# Patient Record
Sex: FEMALE | Race: BLACK OR AFRICAN AMERICAN | NOT HISPANIC OR LATINO | Employment: FULL TIME | URBAN - METROPOLITAN AREA
[De-identification: names, ages, dates, MRNs, and addresses within clinical notes are randomized per-mention and may not be internally consistent; named-entity substitution may affect disease eponyms.]

---

## 2023-09-10 PROBLEM — H04.123 BILATERAL DRY EYES: Status: ACTIVE | Noted: 2023-09-10

## 2023-09-10 PROBLEM — D64.9 ANEMIA: Status: ACTIVE | Noted: 2023-09-10

## 2023-09-10 PROBLEM — R20.2 PARESTHESIA: Status: ACTIVE | Noted: 2023-09-10

## 2023-09-10 PROBLEM — G89.29 CHRONIC NECK AND BACK PAIN: Status: ACTIVE | Noted: 2023-09-10

## 2023-09-10 PROBLEM — K21.9 GERD (GASTROESOPHAGEAL REFLUX DISEASE): Status: ACTIVE | Noted: 2023-09-10

## 2023-09-10 PROBLEM — R00.2 PALPITATION: Status: ACTIVE | Noted: 2023-09-10

## 2023-09-10 PROBLEM — R51.9 HEADACHE DISORDER: Status: ACTIVE | Noted: 2023-09-10

## 2023-09-10 PROBLEM — M25.561 CHRONIC PAIN OF BOTH KNEES: Status: ACTIVE | Noted: 2023-09-10

## 2023-09-10 PROBLEM — M25.562 CHRONIC PAIN OF BOTH KNEES: Status: ACTIVE | Noted: 2023-09-10

## 2023-09-10 PROBLEM — M06.09 POLYARTHRITIS WITH NEGATIVE RHEUMATOID FACTOR (MULTI): Status: ACTIVE | Noted: 2023-09-10

## 2023-09-10 PROBLEM — F43.22 ADJUSTMENT DISORDER WITH ANXIOUS MOOD: Status: ACTIVE | Noted: 2023-09-10

## 2023-09-10 PROBLEM — R41.3 MEMORY CHANGES: Status: ACTIVE | Noted: 2023-09-10

## 2023-09-10 PROBLEM — M54.9 CHRONIC NECK AND BACK PAIN: Status: ACTIVE | Noted: 2023-09-10

## 2023-09-10 PROBLEM — L98.9 SKIN LESIONS: Status: ACTIVE | Noted: 2023-09-10

## 2023-09-10 PROBLEM — R53.1 ASTHENIA: Status: ACTIVE | Noted: 2023-09-10

## 2023-09-10 PROBLEM — R73.9 HYPERGLYCEMIA: Status: ACTIVE | Noted: 2023-09-10

## 2023-09-10 PROBLEM — G89.29 CHRONIC PAIN: Status: ACTIVE | Noted: 2023-09-10

## 2023-09-10 PROBLEM — G57.91 NEURITIS OF RIGHT FOOT: Status: ACTIVE | Noted: 2023-09-10

## 2023-09-10 PROBLEM — M54.2 CHRONIC NECK AND BACK PAIN: Status: ACTIVE | Noted: 2023-09-10

## 2023-09-10 PROBLEM — H25.13 AGE-RELATED NUCLEAR CATARACT, BILATERAL: Status: ACTIVE | Noted: 2023-09-10

## 2023-09-10 PROBLEM — E55.9 VITAMIN D DEFICIENCY: Status: ACTIVE | Noted: 2023-09-10

## 2023-09-10 PROBLEM — G47.00 INSOMNIA: Status: ACTIVE | Noted: 2023-09-10

## 2023-09-10 PROBLEM — R09.82 POSTNASAL DRIP: Status: ACTIVE | Noted: 2023-09-10

## 2023-09-10 PROBLEM — F43.21 GRIEF: Status: ACTIVE | Noted: 2023-09-10

## 2023-09-10 PROBLEM — H54.7 VISUAL ACUITY REDUCED: Status: ACTIVE | Noted: 2023-09-10

## 2023-09-10 PROBLEM — G89.29 CHRONIC PAIN OF BOTH KNEES: Status: ACTIVE | Noted: 2023-09-10

## 2023-09-10 PROBLEM — R76.8 ANA POSITIVE: Status: ACTIVE | Noted: 2023-09-10

## 2023-09-10 PROBLEM — E78.5 HLD (HYPERLIPIDEMIA): Status: ACTIVE | Noted: 2023-09-10

## 2023-09-10 PROBLEM — F07.81 POST CONCUSSION SYNDROME: Status: ACTIVE | Noted: 2023-09-10

## 2023-11-22 ENCOUNTER — TELEPHONE (OUTPATIENT)
Dept: OBSTETRICS AND GYNECOLOGY | Facility: CLINIC | Age: 53
End: 2023-11-22

## 2023-11-22 DIAGNOSIS — R92.8 ABNORMAL MAMMOGRAM: Primary | ICD-10-CM

## 2023-11-30 ENCOUNTER — HOSPITAL ENCOUNTER (OUTPATIENT)
Dept: RADIOLOGY | Facility: HOSPITAL | Age: 53
Discharge: HOME | End: 2023-11-30
Payer: COMMERCIAL

## 2023-11-30 DIAGNOSIS — R92.8 ABNORMAL MAMMOGRAM: ICD-10-CM

## 2023-11-30 DIAGNOSIS — R92.8 OTHER ABNORMAL AND INCONCLUSIVE FINDINGS ON DIAGNOSTIC IMAGING OF BREAST: ICD-10-CM

## 2023-12-11 ENCOUNTER — APPOINTMENT (OUTPATIENT)
Dept: PRIMARY CARE | Facility: CLINIC | Age: 53
End: 2023-12-11

## 2023-12-12 NOTE — PROGRESS NOTES
"This is a 53 year old female for ROUTINE MEDICAL and indicates she has MUCH JOB STRESS and took FMLA the past week and the last three days also had FLARE UP of CHRONIC PAIN.      ROS is NEG for HEADACHE, NAUSEA, VOMITING, DIARRHEA, CHEST PAIN, SOB, and BLEEDING and as further REVIEWED BELOW.      Subjective   Terece JEWEL Boo is a 53 y.o. female who presents for Annual Exam.    HPI:        Review of systems is essentially negative for all systems except for any identified issues in HPI above.    Objective     /82   Pulse 83   Temp 36.4 °C (97.5 °F)   Ht 1.6 m (5' 3\")   Wt 78.5 kg (173 lb)   SpO2 100%   BMI 30.65 kg/m²      COMPLETE PHYSICAL EXAM    GENERAL           General Appearance: pleasant, well-appearing, well-developed, well-hydrated, well-nourished, well-groomed, .        HEENT           NECK supple, Neg for adneopathy no thyroid enlargement or nodules, Oropharynx normal no exudates.        EYES           Pupils: PERRLA, no photophobia.        HEART           Rate and Rhythm regular rate and rhythm. Heart sounds: normal S1S2. Murmurs: none.        LUNGS           Effort: Normal chest wall, no pectus, Normal air entry all fields, Clear to IPPA, RR<16 with no use of accessory muscles.        BREASTS           Bilaterally: no masses, no nipple retraction, no nipple discharge, no abnormal skin changes. Axilla: no lymphadenopathy.  HAS POS FAM Hx BREAST CA and was ASKED TO START ESTROGEN and PT THANKFULLY DECLINED        BACK           General: unremarkable, no spinal tenderness or rashes.        ABDOMEN           General: Normal to inspection, neg for LKKS or masses and BSs heard in all quadrants                 LYMPHATICS           Cervical: none. Axillary: none.        MUSCULOSKELETAL           gross abnormalities no gross abnormalities, no joint redness or swelling.        EXTREMITIES           Varicose veins: not present. Pulses: 2+ bilateral. Clubbing: none. Cyanosis: no.        " NEUROLOGICAL           Orientation: alert and oriented x 3. Grossly normal: yes. Plantars: downgoing bilaterally. Muscle Bulk: normal . Cranial Nerves: CN's II-XII grossly intact.        PSYCHOLOGY           Affect: appropriate. Mood: pleasant.     Assessment/Plan   Problem List Items Addressed This Visit       Adjustment disorder with anxious mood    ALEXA positive    Paresthesia    Polyarthritis with negative rheumatoid factor (CMS/HCC)     Other Visit Diagnoses       Routine medical exam    -  Primary    Relevant Orders    Comprehensive metabolic panel    Microscopic Only, Urine    Hemoglobin A1c    Screening mammogram for breast cancer        Relevant Orders    BI mammo bilateral screening tomosynthesis    Screening for colorectal cancer        Relevant Orders    CBC    Screening, lipid        Health counseling        Immunization counseling        Family history of breast cancer                FOLLOW UP:       YEARLY for ROUTINE MEDICAL and PRN for other issues as discussed in visit         Kerry Hollingsworth M.D.

## 2023-12-15 ENCOUNTER — OFFICE VISIT (OUTPATIENT)
Dept: PRIMARY CARE | Facility: CLINIC | Age: 53
End: 2023-12-15
Payer: COMMERCIAL

## 2023-12-15 ENCOUNTER — LAB (OUTPATIENT)
Dept: LAB | Facility: LAB | Age: 53
End: 2023-12-15
Payer: COMMERCIAL

## 2023-12-15 VITALS
WEIGHT: 173 LBS | BODY MASS INDEX: 30.65 KG/M2 | TEMPERATURE: 97.5 F | DIASTOLIC BLOOD PRESSURE: 82 MMHG | OXYGEN SATURATION: 100 % | SYSTOLIC BLOOD PRESSURE: 130 MMHG | HEART RATE: 83 BPM | HEIGHT: 63 IN

## 2023-12-15 DIAGNOSIS — Z12.12 SCREENING FOR COLORECTAL CANCER: ICD-10-CM

## 2023-12-15 DIAGNOSIS — Z12.11 SCREENING FOR COLORECTAL CANCER: ICD-10-CM

## 2023-12-15 DIAGNOSIS — R20.2 PARESTHESIA: ICD-10-CM

## 2023-12-15 DIAGNOSIS — R76.8 ANA POSITIVE: ICD-10-CM

## 2023-12-15 DIAGNOSIS — M06.09 POLYARTHRITIS WITH NEGATIVE RHEUMATOID FACTOR (MULTI): ICD-10-CM

## 2023-12-15 DIAGNOSIS — Z00.00 ROUTINE MEDICAL EXAM: Primary | ICD-10-CM

## 2023-12-15 DIAGNOSIS — Z12.31 SCREENING MAMMOGRAM FOR BREAST CANCER: ICD-10-CM

## 2023-12-15 DIAGNOSIS — Z80.3 FAMILY HISTORY OF BREAST CANCER: ICD-10-CM

## 2023-12-15 DIAGNOSIS — Z13.220 SCREENING, LIPID: ICD-10-CM

## 2023-12-15 DIAGNOSIS — Z71.9 HEALTH COUNSELING: ICD-10-CM

## 2023-12-15 DIAGNOSIS — Z71.85 IMMUNIZATION COUNSELING: ICD-10-CM

## 2023-12-15 DIAGNOSIS — Z00.00 ROUTINE MEDICAL EXAM: ICD-10-CM

## 2023-12-15 DIAGNOSIS — F43.22 ADJUSTMENT DISORDER WITH ANXIOUS MOOD: ICD-10-CM

## 2023-12-15 LAB
ALBUMIN SERPL BCP-MCNC: 4.3 G/DL (ref 3.4–5)
ALP SERPL-CCNC: 67 U/L (ref 33–110)
ALT SERPL W P-5'-P-CCNC: 18 U/L (ref 7–45)
ANION GAP SERPL CALC-SCNC: 13 MMOL/L (ref 10–20)
AST SERPL W P-5'-P-CCNC: 19 U/L (ref 9–39)
BACTERIA #/AREA URNS AUTO: ABNORMAL /HPF
BILIRUB SERPL-MCNC: 0.4 MG/DL (ref 0–1.2)
BUN SERPL-MCNC: 13 MG/DL (ref 6–23)
CALCIUM SERPL-MCNC: 9.7 MG/DL (ref 8.6–10.6)
CAOX CRY #/AREA UR COMP ASSIST: ABNORMAL /HPF
CHLORIDE SERPL-SCNC: 106 MMOL/L (ref 98–107)
CO2 SERPL-SCNC: 28 MMOL/L (ref 21–32)
CREAT SERPL-MCNC: 0.66 MG/DL (ref 0.5–1.05)
ERYTHROCYTE [DISTWIDTH] IN BLOOD BY AUTOMATED COUNT: 12.7 % (ref 11.5–14.5)
EST. AVERAGE GLUCOSE BLD GHB EST-MCNC: 117 MG/DL
GFR SERPL CREATININE-BSD FRML MDRD: >90 ML/MIN/1.73M*2
GLUCOSE SERPL-MCNC: 77 MG/DL (ref 74–99)
HBA1C MFR BLD: 5.7 %
HCT VFR BLD AUTO: 41.5 % (ref 36–46)
HGB BLD-MCNC: 13.7 G/DL (ref 12–16)
MCH RBC QN AUTO: 31.9 PG (ref 26–34)
MCHC RBC AUTO-ENTMCNC: 33 G/DL (ref 32–36)
MCV RBC AUTO: 97 FL (ref 80–100)
MUCOUS THREADS #/AREA URNS AUTO: ABNORMAL /LPF
NRBC BLD-RTO: 0 /100 WBCS (ref 0–0)
PLATELET # BLD AUTO: 183 X10*3/UL (ref 150–450)
POTASSIUM SERPL-SCNC: 4.3 MMOL/L (ref 3.5–5.3)
PROT SERPL-MCNC: 7.8 G/DL (ref 6.4–8.2)
RBC # BLD AUTO: 4.29 X10*6/UL (ref 4–5.2)
RBC #/AREA URNS AUTO: ABNORMAL /HPF
SODIUM SERPL-SCNC: 143 MMOL/L (ref 136–145)
SQUAMOUS #/AREA URNS AUTO: ABNORMAL /HPF
WBC # BLD AUTO: 7 X10*3/UL (ref 4.4–11.3)
WBC #/AREA URNS AUTO: ABNORMAL /HPF

## 2023-12-15 PROCEDURE — 36415 COLL VENOUS BLD VENIPUNCTURE: CPT

## 2023-12-15 PROCEDURE — 99396 PREV VISIT EST AGE 40-64: CPT | Performed by: FAMILY MEDICINE

## 2023-12-15 PROCEDURE — 90471 IMMUNIZATION ADMIN: CPT | Performed by: FAMILY MEDICINE

## 2023-12-15 PROCEDURE — 1036F TOBACCO NON-USER: CPT | Performed by: FAMILY MEDICINE

## 2023-12-15 PROCEDURE — 83036 HEMOGLOBIN GLYCOSYLATED A1C: CPT

## 2023-12-15 PROCEDURE — 80053 COMPREHEN METABOLIC PANEL: CPT

## 2023-12-15 PROCEDURE — 85027 COMPLETE CBC AUTOMATED: CPT

## 2023-12-15 PROCEDURE — 81001 URINALYSIS AUTO W/SCOPE: CPT

## 2023-12-15 PROCEDURE — 90662 IIV NO PRSV INCREASED AG IM: CPT | Performed by: FAMILY MEDICINE

## 2023-12-15 RX ORDER — NAPROXEN 500 MG/1
TABLET ORAL EVERY 12 HOURS
COMMUNITY
Start: 2019-08-21

## 2023-12-15 RX ORDER — ACETAMINOPHEN 325 MG/1
TABLET ORAL EVERY 4 HOURS
COMMUNITY

## 2023-12-15 ASSESSMENT — PATIENT HEALTH QUESTIONNAIRE - PHQ9
SUM OF ALL RESPONSES TO PHQ9 QUESTIONS 1 AND 2: 0
2. FEELING DOWN, DEPRESSED OR HOPELESS: NOT AT ALL
1. LITTLE INTEREST OR PLEASURE IN DOING THINGS: NOT AT ALL

## 2023-12-15 ASSESSMENT — PAIN SCALES - GENERAL: PAINLEVEL: 8

## 2023-12-19 ENCOUNTER — TELEPHONE (OUTPATIENT)
Dept: PRIMARY CARE | Facility: CLINIC | Age: 53
End: 2023-12-19

## 2023-12-19 ENCOUNTER — OFFICE VISIT (OUTPATIENT)
Dept: PRIMARY CARE | Facility: CLINIC | Age: 53
End: 2023-12-19
Payer: COMMERCIAL

## 2023-12-19 VITALS
HEIGHT: 63 IN | WEIGHT: 173 LBS | BODY MASS INDEX: 30.65 KG/M2 | TEMPERATURE: 97.5 F | DIASTOLIC BLOOD PRESSURE: 82 MMHG | HEART RATE: 72 BPM | SYSTOLIC BLOOD PRESSURE: 120 MMHG | OXYGEN SATURATION: 98 %

## 2023-12-19 DIAGNOSIS — R82.90 ABNORMAL URINALYSIS: Primary | ICD-10-CM

## 2023-12-19 PROCEDURE — 1036F TOBACCO NON-USER: CPT | Performed by: FAMILY MEDICINE

## 2023-12-19 PROCEDURE — 99214 OFFICE O/P EST MOD 30 MIN: CPT | Performed by: FAMILY MEDICINE

## 2023-12-19 ASSESSMENT — PATIENT HEALTH QUESTIONNAIRE - PHQ9
1. LITTLE INTEREST OR PLEASURE IN DOING THINGS: NOT AT ALL
2. FEELING DOWN, DEPRESSED OR HOPELESS: NOT AT ALL
SUM OF ALL RESPONSES TO PHQ9 QUESTIONS 1 AND 2: 0

## 2023-12-19 ASSESSMENT — PAIN SCALES - GENERAL: PAINLEVEL: 7

## 2023-12-19 NOTE — TELEPHONE ENCOUNTER
MD Josselin Lovelace MA  Yes, OK to make exception to my overbooking schedule and bring her in at her convenience for FU to expedite CT scanning etc.                ----- Message -----  From: Kerry Arana MD  Sent: 12/18/2023   9:51 AM EST  To: Alysha Waddell LPN; *    Prefer to have her seen in office and will make exception to double book her please offer her a visit at a time convenient to avoid extreme overbooking.

## 2023-12-19 NOTE — PROGRESS NOTES
"This is a 53 year old female for FU visit for EVAL of POSSIBLE RENAL STONES shown as CALCIUM OXYLATE in UA and CT CONTRAST ALLERGY LISTED in chart but is NOT a TRUE ALLERGY; just anxiety about CT scans;  so we order today.                  Component  Ref Range & Units 4 d ago  (12/15/23) 7 mo ago  (5/5/23) 9 mo ago  (3/13/23) 9 mo ago  (3/13/23) 1 yr ago  (8/26/22) 1 yr ago  (6/2/22) 2 yr ago  (11/5/21)   Glucose  74 - 99 mg/dL 77 78 R  92 R 99 R 116 High  R 92 R   Sodium  136 - 145 mmol/L 143 142 R  141 R 139 R 139 R 136 R   Potassium  3.5 - 5.3 mmol/L 4.3 3.9 R  3.6 R 3.9 R 3.7 R 4.1 R   Comment: MILD HEMOLYSIS DETECTED. The result may be falsely elevated due to hemolysis or other interferents. Clinical correlation is recommended. Repeat testing may be considered.   Chloride  98 - 107 mmol/L 106 105 R  106 R 101 R 102 R 102 R   Bicarbonate  21 - 32 mmol/L 28 26 R  25 R 27 R 26 R 27 R   Anion Gap  10 - 20 mmol/L 13 11 R  10 R 11 R 11 R 7 R   Urea Nitrogen  6 - 23 mg/dL 13 11 R  8 R 9 R 13 R 12 R   Creatinine  0.50 - 1.05 mg/dL 0.66 0.8 R  0.8 R 0.7 R 0.8 R 0.8 R   eGFR  >60 mL/min/1.73m*2 >90            Subjective   Randy Boo is a 53 y.o. female who presents for Flank Pain.    HPI:        Review of systems is essentially negative for all systems except for any identified issues in HPI above.    Objective     /82   Pulse 72   Temp 36.4 °C (97.5 °F)   Ht 1.6 m (5' 3\")   Wt 78.5 kg (173 lb)   SpO2 98%   BMI 30.65 kg/m²      Physical Examination:       GENERAL           General Appearance: well-appearing, well-developed, well-hydrated, well-nourished, no acute distress.        HEENT           NECK supple, no masses or thyromegaly, no carotid bruit.        EYES           Extraocular Movements: normal, bilateral eyes HUONG, no conjunctival injection.        HEART           Rate and Rhythm regular rate and rhythm. Heart sounds: normal S1S2, no S3 or S4. Murmurs: none.        CHEST           " Breath sounds: Clear to IPPA, RR<16 no use of accessory muscles. NEG FOR CVA TENDERNESS but POS FOR LBP and ++ CALCIUM CRYSTALS       ABDOMEN            General: Neg for LKKS or masses, no scleral icterus or jaundice.   NEG FOR SUPRAPUBIC tenderness       MUSCULOSKELETAL           Joints Demonstration: Neg for erythema, swelling or joint deformities. gross abnormalities no gross abnormalities.        EXTREMITIES           Lower Extremities: Neg for cyanosis, clubbing or edema.       Assessment/Plan   Problem List Items Addressed This Visit    None  Visit Diagnoses       Abnormal urinalysis    -  Primary    Relevant Orders    Urine Culture    Referral to Urology    CT abdomen pelvis w IV contrast            FOLLOW UP:  PRN and as specified above         Kerry Hollingsworth M.D.       Minimal and ONLY LBP but no FLANK pain or PELVIC PAIN but will try to rule out stones at pt request.      3 Result Notes         Component  Ref Range & Units 4 d ago  (12/15/23) 1 yr ago  (8/26/22) 1 yr ago  (6/2/22) 5 yr ago  (5/6/18)   WBC, Urine  1-5, NONE /HPF 11-20 Abnormal  1 R 2 R 5 Abnormal  R   RBC, Urine  NONE, 1-2, 3-5 /HPF 1-2 1 R 2 R 2 Abnormal  R   Squamous Epithelial Cells, Urine  Reference range not established. /HPF 1-9 (SPARSE) NONE SEEN R FEW R 2 R   Bacteria, Urine  NONE SEEN /HPF 1+ Abnormal  NEGATIVE R POSITIVE R 1+ Abnormal  R   Mucus, Urine  Reference range not established. /LPF 1+   2+ R   Calcium Oxalate Crystals, Urine  NONE, 1+ /HPF 3+ Abnormal                ROS is NEG for HEADACHE, NAUSEA, VOMITING, DIARRHEA, CHEST PAIN, SOB, and BLEEDING and as further REVIEWED BELOW.

## 2023-12-19 NOTE — LETTER
December 19, 2023     Patient: Randy Boo   YOB: 1970   Date of Visit: 12/19/2023       To Whom It May Concern:    Randy Boo was seen in my clinic on 12/19/2023 at 10:00 am. Please excuse Randy for her absence from work on this day to make the appointment.    If you have any questions or concerns, please don't hesitate to call.         Sincerely,         Kerry Arana MD

## 2023-12-20 ENCOUNTER — APPOINTMENT (OUTPATIENT)
Dept: PRIMARY CARE | Facility: CLINIC | Age: 53
End: 2023-12-20
Payer: MEDICARE

## 2023-12-26 ENCOUNTER — HOSPITAL ENCOUNTER (OUTPATIENT)
Dept: RADIOLOGY | Facility: HOSPITAL | Age: 53
Discharge: HOME | End: 2023-12-26
Payer: COMMERCIAL

## 2023-12-26 ENCOUNTER — TELEPHONE (OUTPATIENT)
Dept: PRIMARY CARE | Facility: CLINIC | Age: 53
End: 2023-12-26
Payer: COMMERCIAL

## 2023-12-26 DIAGNOSIS — R82.90 ABNORMAL URINALYSIS: ICD-10-CM

## 2023-12-26 DIAGNOSIS — R11.0 NAUSEA: Primary | ICD-10-CM

## 2023-12-26 PROCEDURE — 74176 CT ABD & PELVIS W/O CONTRAST: CPT

## 2023-12-26 RX ORDER — ONDANSETRON 4 MG/1
4 TABLET, FILM COATED ORAL EVERY 8 HOURS PRN
Qty: 10 TABLET | Refills: 0 | Status: SHIPPED | OUTPATIENT
Start: 2023-12-26

## 2023-12-26 NOTE — TELEPHONE ENCOUNTER
PT calling requesting RX for nausea be sent in, as PT has a CT and MRI scheduled in the next few days with dye injection.  PT wondering if OK to do tests 2 days in a row with the dye injection. Should it all be done in one day?  Please advise

## 2023-12-27 ENCOUNTER — HOSPITAL ENCOUNTER (OUTPATIENT)
Dept: RADIOLOGY | Facility: HOSPITAL | Age: 53
Discharge: HOME | End: 2023-12-27
Payer: COMMERCIAL

## 2023-12-27 DIAGNOSIS — Z12.31 SCREENING MAMMOGRAM FOR BREAST CANCER: ICD-10-CM

## 2023-12-27 DIAGNOSIS — Z80.3 FAMILY HISTORY OF BREAST CANCER: ICD-10-CM

## 2023-12-27 PROCEDURE — 77049 MRI BREAST C-+ W/CAD BI: CPT | Performed by: STUDENT IN AN ORGANIZED HEALTH CARE EDUCATION/TRAINING PROGRAM

## 2023-12-27 PROCEDURE — 2550000001 HC RX 255 CONTRASTS: Mod: SE | Performed by: FAMILY MEDICINE

## 2023-12-27 PROCEDURE — 77049 MRI BREAST C-+ W/CAD BI: CPT

## 2023-12-27 PROCEDURE — A9575 INJ GADOTERATE MEGLUMI 0.1ML: HCPCS | Mod: SE | Performed by: FAMILY MEDICINE

## 2023-12-27 RX ORDER — GADOTERATE MEGLUMINE 376.9 MG/ML
12 INJECTION INTRAVENOUS
Status: COMPLETED | OUTPATIENT
Start: 2023-12-27 | End: 2023-12-27

## 2023-12-27 RX ADMIN — GADOTERATE MEGLUMINE 12 ML: 376.9 INJECTION INTRAVENOUS at 20:58

## 2023-12-29 ENCOUNTER — TELEPHONE (OUTPATIENT)
Dept: PRIMARY CARE | Facility: CLINIC | Age: 53
End: 2023-12-29
Payer: COMMERCIAL

## 2023-12-29 NOTE — TELEPHONE ENCOUNTER
PT states FMLA paperwork about a month ago, but HR at her job has just recently told her that the paperwork was filled out incorrectly.  PT states she has work restrictions due to medical conditions that she is still dealing with, and her job is requiring her to walk further distances than she is able to.   States paperwork does not identify what the true disability is.  HR is requesting a diagnosis that requires her to be unable to walk causing a disability. Please advise if this can be resent with correct information. Originally dated 12/15/2023.

## 2024-01-03 ENCOUNTER — APPOINTMENT (OUTPATIENT)
Dept: PRIMARY CARE | Facility: CLINIC | Age: 54
End: 2024-01-03
Payer: COMMERCIAL

## 2024-01-03 NOTE — PROGRESS NOTES
This is a 53 year old female for FU BREAST MRI RESULTS SHOWING INTRAMAMMARY LYMPH NODE and to discuss WORK RESTRICTIONS and REVIEW of MRI BREASTS showing INTRAMAMMARY LYMPH NODE so will refer to GENERAL BREAST SURGEON for opinion; and RECENT CT ABDO non obstructive renal CALCULUS.  She is attending UROLOGY for this and has already been seen with 6 month FU visit planned.  COPIOUS FLUIDS recommended.    MRI without evidence of malignancy in either breast.  Left breast does show a likely intramammary lymph node. Recommend PCP follow-up for further discussion/evaluation of breast pain.     SHE HAS URI Sxs today and sneezing and sore throat and this was converted from OFFICE VISIT to TELE VISIT.  ENCOURAGED TO HOME TEST for COVID (states she took son to  and no testing was done except for sore throat she says).  SHE WILL INITIATE a HOME QUARANTINE and has NO SOB or CP or other worrisome features.   Encoruaged to HOME TEST for COVID as well.  SON who is ill apparently neg for STREP.      IMPRESSION:  No MRI evidence of malignancy in either breast.  Clinical follow-up  is recommended for the patient's breast pain.      BI-RADS CATEGORY:  BI-RADS Category:  2 Benign.  Recommendation:  Routine Screening Mammogram in 1 Year.  Recommended Date:  1 Year.  Laterality:  Bilateral.      ROS is NEG for HEADACHE, NAUSEA, VOMITING, DIARRHEA, CHEST PAIN, SOB, and BLEEDING and as further REVIEWED BELOW.    Subjective   Randy Boo is a 53 y.o. female who presents for Letter for School/Work.    HPI:        Review of systems is essentially negative for all systems except for any identified issues in HPI above.    Objective     There were no vitals taken for this visit.     Assessment/Plan     ALSO NEEDS PPWK for REASONABLE ACCOMMODATION to be revised and FMLA PPWK  and DISABILITY PPWK REVISED so needs a FU visit once out of HOME QUARANTINE.  To meanwhile obtain and review and complete as much as she can on her own and  bring PPWK to our office for a VISIT with Dr. Hollingsworth      AT THIS TIME she declines PCR COVID TESTING and STREP TESTING but will report to  if sxs worsen and will do a HOME TEST and HOME QUARANTINE as per protocol 5 days from onset of Sxs.    Problem List Items Addressed This Visit       Adjustment disorder with anxious mood    ALEXA positive    Paresthesia - Primary    Polyarthritis with negative rheumatoid factor (CMS/HCC)     Other Visit Diagnoses       Health counseling        Immunization counseling        Mastalgia        Relevant Orders    Referral to Breast Surgery    Abnormal MRI, breast        Upper respiratory tract infection, unspecified type                FOLLOW UP:  PRN and as specified above         Kerry Hollingsworth M.D.

## 2024-01-04 ENCOUNTER — OFFICE VISIT (OUTPATIENT)
Dept: UROLOGY | Facility: CLINIC | Age: 54
End: 2024-01-04
Payer: COMMERCIAL

## 2024-01-04 ENCOUNTER — APPOINTMENT (OUTPATIENT)
Dept: UROLOGY | Facility: CLINIC | Age: 54
End: 2024-01-04
Payer: COMMERCIAL

## 2024-01-04 VITALS
BODY MASS INDEX: 30.43 KG/M2 | WEIGHT: 171.8 LBS | SYSTOLIC BLOOD PRESSURE: 105 MMHG | TEMPERATURE: 97.1 F | HEART RATE: 84 BPM | RESPIRATION RATE: 18 BRPM | DIASTOLIC BLOOD PRESSURE: 70 MMHG

## 2024-01-04 DIAGNOSIS — R82.90 ABNORMAL URINALYSIS: ICD-10-CM

## 2024-01-04 DIAGNOSIS — N20.0 KIDNEY STONES: Primary | ICD-10-CM

## 2024-01-04 LAB
POC APPEARANCE, URINE: CLEAR
POC BILIRUBIN, URINE: NEGATIVE
POC BLOOD, URINE: ABNORMAL
POC COLOR, URINE: ABNORMAL
POC GLUCOSE, URINE: NEGATIVE MG/DL
POC KETONES, URINE: ABNORMAL MG/DL
POC LEUKOCYTES, URINE: ABNORMAL
POC NITRITE,URINE: NEGATIVE
POC PH, URINE: 6 PH
POC PROTEIN, URINE: ABNORMAL MG/DL
POC SPECIFIC GRAVITY, URINE: 1.02
POC UROBILINOGEN, URINE: 1 EU/DL

## 2024-01-04 PROCEDURE — 99214 OFFICE O/P EST MOD 30 MIN: CPT | Mod: ZK | Performed by: PHYSICIAN ASSISTANT

## 2024-01-04 PROCEDURE — 99204 OFFICE O/P NEW MOD 45 MIN: CPT | Performed by: PHYSICIAN ASSISTANT

## 2024-01-04 PROCEDURE — 81001 URINALYSIS AUTO W/SCOPE: CPT | Performed by: PHYSICIAN ASSISTANT

## 2024-01-04 PROCEDURE — 81003 URINALYSIS AUTO W/O SCOPE: CPT | Performed by: NURSE PRACTITIONER

## 2024-01-04 PROCEDURE — 87086 URINE CULTURE/COLONY COUNT: CPT | Performed by: PHYSICIAN ASSISTANT

## 2024-01-04 PROCEDURE — 81003 URINALYSIS AUTO W/O SCOPE: CPT | Mod: ZK | Performed by: NURSE PRACTITIONER

## 2024-01-04 PROCEDURE — 1036F TOBACCO NON-USER: CPT | Performed by: PHYSICIAN ASSISTANT

## 2024-01-04 ASSESSMENT — ENCOUNTER SYMPTOMS
DEPRESSION: 0
OCCASIONAL FEELINGS OF UNSTEADINESS: 0
LOSS OF SENSATION IN FEET: 0

## 2024-01-04 ASSESSMENT — PAIN SCALES - GENERAL: PAINLEVEL: 0-NO PAIN

## 2024-01-05 PROBLEM — N20.0 KIDNEY STONES: Status: ACTIVE | Noted: 2024-01-05

## 2024-01-05 LAB
APPEARANCE UR: ABNORMAL
BACTERIA UR CULT: NORMAL
BILIRUB UR STRIP.AUTO-MCNC: NEGATIVE MG/DL
COLOR UR: ABNORMAL
GLUCOSE UR STRIP.AUTO-MCNC: NEGATIVE MG/DL
KETONES UR STRIP.AUTO-MCNC: NEGATIVE MG/DL
LEUKOCYTE ESTERASE UR QL STRIP.AUTO: ABNORMAL
NITRITE UR QL STRIP.AUTO: NEGATIVE
PH UR STRIP.AUTO: 6 [PH]
PROT UR STRIP.AUTO-MCNC: NEGATIVE MG/DL
RBC # UR STRIP.AUTO: NEGATIVE /UL
RBC #/AREA URNS AUTO: NORMAL /HPF
SP GR UR STRIP.AUTO: 1.02
SQUAMOUS #/AREA URNS AUTO: NORMAL /HPF
UROBILINOGEN UR STRIP.AUTO-MCNC: 2 MG/DL
WBC #/AREA URNS AUTO: NORMAL /HPF

## 2024-01-05 ASSESSMENT — ENCOUNTER SYMPTOMS
ALLERGIC/IMMUNOLOGIC NEGATIVE: 1
GASTROINTESTINAL NEGATIVE: 1
ENDOCRINE NEGATIVE: 1
CONSTITUTIONAL NEGATIVE: 1
RESPIRATORY NEGATIVE: 1
MUSCULOSKELETAL NEGATIVE: 1
HEMATOLOGIC/LYMPHATIC NEGATIVE: 1
EYES NEGATIVE: 1
PSYCHIATRIC NEGATIVE: 1
CARDIOVASCULAR NEGATIVE: 1
NEUROLOGICAL NEGATIVE: 1

## 2024-01-05 NOTE — PROGRESS NOTES
Subjective   Patient ID: Randy Boo is a 53 y.o. female who presents for New Patient Visit.  HPI  Patient is a 52 yo female kindly referred by Dr Barry for management of renal stone.     Patient had a CT scan for evaluation of back pain and had an incidental finding of a 3 mm right renal stone. Patient denies history of kidney stones. She denies flank pain nausea or vomiting. She denies gross hematuria, dysuria, urinary frequency or urgency.     Urinalysis positive for trace of leuks and frequent blood    Review of Systems   Constitutional: Negative.    HENT: Negative.     Eyes: Negative.    Respiratory: Negative.     Cardiovascular: Negative.    Gastrointestinal: Negative.    Endocrine: Negative.    Genitourinary: Negative.    Musculoskeletal: Negative.    Skin: Negative.    Allergic/Immunologic: Negative.    Neurological: Negative.    Hematological: Negative.    Psychiatric/Behavioral: Negative.         Objective   Physical Exam  Constitutional:       General: She is not in acute distress.     Appearance: Normal appearance.   HENT:      Head: Normocephalic and atraumatic.      Nose: Nose normal.      Mouth/Throat:      Mouth: Mucous membranes are dry.   Cardiovascular:      Rate and Rhythm: Normal rate.   Pulmonary:      Effort: Pulmonary effort is normal.   Abdominal:      General: Abdomen is flat.      Palpations: Abdomen is soft.   Musculoskeletal:         General: Normal range of motion.      Cervical back: Normal range of motion and neck supple.   Skin:     General: Skin is warm and dry.   Neurological:      General: No focal deficit present.      Mental Status: She is alert and oriented to person, place, and time.   Psychiatric:         Mood and Affect: Mood normal.         Assessment/Plan   Problem List Items Addressed This Visit             ICD-10-CM    Kidney stones - Primary N20.0     I discussed kidny stone prevention with increasing fluids especially water to 2-2.5 L a day  Reduce  salt to 2g or less daily  Reduce oxalates.   Increase citrates by squeezing ramin in water.    Reading material provided    Monitpr stone size with KRISTAL in 6 months    I discussed signs and symptoms of ureteral stone including  sharp intermittent flank pain radiating to the lower back and abdomen, nausea vomiting, hematuria, increased urinary frequency or urgency possible fever or chills.    Patient aware to follow-up sooner.         Relevant Orders    US renal complete    Urine culture    Urinalysis with Reflex Microscopic (Completed)    POCT UA (nonautomated) manually resulted (Completed)    Microscopic Only, Urine (Completed)     Other Visit Diagnoses         Codes    Abnormal urinalysis     R82.90    Relevant Orders    Urine culture    Urinalysis with Reflex Microscopic (Completed)    Microscopic Only, Urine (Completed)                 Elia Hale PA-C 01/05/24 12:50 PM

## 2024-01-05 NOTE — ASSESSMENT & PLAN NOTE
I discussed kidny stone prevention with increasing fluids especially water to 2-2.5 L a day  Reduce salt to 2g or less daily  Reduce oxalates.   Increase citrates by squeezing ramin in water.    Reading material provided    Monitpr stone size with KRISTAL in 6 months    I discussed signs and symptoms of ureteral stone including  sharp intermittent flank pain radiating to the lower back and abdomen, nausea vomiting, hematuria, increased urinary frequency or urgency possible fever or chills.    Patient aware to follow-up sooner.

## 2024-01-09 ENCOUNTER — TELEMEDICINE (OUTPATIENT)
Dept: PRIMARY CARE | Facility: CLINIC | Age: 54
End: 2024-01-09
Payer: COMMERCIAL

## 2024-01-09 DIAGNOSIS — R92.8 ABNORMAL MRI, BREAST: ICD-10-CM

## 2024-01-09 DIAGNOSIS — F43.22 ADJUSTMENT DISORDER WITH ANXIOUS MOOD: ICD-10-CM

## 2024-01-09 DIAGNOSIS — Z71.85 IMMUNIZATION COUNSELING: ICD-10-CM

## 2024-01-09 DIAGNOSIS — N64.4 MASTALGIA: ICD-10-CM

## 2024-01-09 DIAGNOSIS — M06.09 POLYARTHRITIS WITH NEGATIVE RHEUMATOID FACTOR (MULTI): ICD-10-CM

## 2024-01-09 DIAGNOSIS — J06.9 UPPER RESPIRATORY TRACT INFECTION, UNSPECIFIED TYPE: ICD-10-CM

## 2024-01-09 DIAGNOSIS — R20.2 PARESTHESIA: Primary | ICD-10-CM

## 2024-01-09 DIAGNOSIS — Z71.9 HEALTH COUNSELING: ICD-10-CM

## 2024-01-09 DIAGNOSIS — R76.8 ANA POSITIVE: ICD-10-CM

## 2024-01-09 PROCEDURE — 99442 PR PHYS/QHP TELEPHONE EVALUATION 11-20 MIN: CPT | Performed by: FAMILY MEDICINE

## 2024-01-09 ASSESSMENT — PATIENT HEALTH QUESTIONNAIRE - PHQ9
1. LITTLE INTEREST OR PLEASURE IN DOING THINGS: NOT AT ALL
SUM OF ALL RESPONSES TO PHQ9 QUESTIONS 1 AND 2: 0
2. FEELING DOWN, DEPRESSED OR HOPELESS: NOT AT ALL

## 2024-02-08 ENCOUNTER — APPOINTMENT (OUTPATIENT)
Dept: SURGICAL ONCOLOGY | Facility: CLINIC | Age: 54
End: 2024-02-08
Payer: COMMERCIAL

## 2024-02-12 ENCOUNTER — TELEPHONE (OUTPATIENT)
Dept: PRIMARY CARE | Facility: CLINIC | Age: 54
End: 2024-02-12

## 2024-02-12 ENCOUNTER — CLINICAL SUPPORT (OUTPATIENT)
Dept: PRIMARY CARE | Facility: CLINIC | Age: 54
End: 2024-02-12
Payer: COMMERCIAL

## 2024-02-12 DIAGNOSIS — R05.9 COUGH, UNSPECIFIED TYPE: ICD-10-CM

## 2024-02-12 PROCEDURE — 87502 INFLUENZA DNA AMP PROBE: CPT | Mod: WESLAB

## 2024-02-12 NOTE — TELEPHONE ENCOUNTER
Patient called in thinking she may have the flu - asking if she can take mucinex and advil at the same time? Please advise. She tested covid negative on a home test yesterday. Asking if she can be added to the nurse schedule to be swabbed for flu?

## 2024-02-13 ENCOUNTER — TELEMEDICINE (OUTPATIENT)
Dept: PRIMARY CARE | Facility: CLINIC | Age: 54
End: 2024-02-13
Payer: COMMERCIAL

## 2024-02-13 DIAGNOSIS — Z71.9 HEALTH COUNSELING: ICD-10-CM

## 2024-02-13 DIAGNOSIS — J10.1 INFLUENZA B: ICD-10-CM

## 2024-02-13 DIAGNOSIS — J11.1 INFLUENZA: Primary | ICD-10-CM

## 2024-02-13 LAB
FLUAV RNA RESP QL NAA+PROBE: NOT DETECTED
FLUBV RNA RESP QL NAA+PROBE: DETECTED

## 2024-02-13 PROCEDURE — 1036F TOBACCO NON-USER: CPT | Performed by: FAMILY MEDICINE

## 2024-02-13 PROCEDURE — 99442 PR PHYS/QHP TELEPHONE EVALUATION 11-20 MIN: CPT | Performed by: FAMILY MEDICINE

## 2024-02-13 ASSESSMENT — ENCOUNTER SYMPTOMS
DEPRESSION: 0
LOSS OF SENSATION IN FEET: 0
OCCASIONAL FEELINGS OF UNSTEADINESS: 0

## 2024-02-13 ASSESSMENT — PATIENT HEALTH QUESTIONNAIRE - PHQ9
2. FEELING DOWN, DEPRESSED OR HOPELESS: NOT AT ALL
1. LITTLE INTEREST OR PLEASURE IN DOING THINGS: NOT AT ALL
SUM OF ALL RESPONSES TO PHQ9 QUESTIONS 1 AND 2: 0

## 2024-02-13 NOTE — PATIENT INSTRUCTIONS
SHE WILL NEED a NOTE FOR OFF WORK ALL WEEK THIS WEEK and until MONDAY next week to rest and recover from INFLUENZA B VIRAL ILLNESS and to report to ER of choice if CHEST PAIN or SHORTNESS OF BREATH occur.    We are unfortuantely well outside the range for BENEFIT from TAMIFLU.

## 2024-02-13 NOTE — PROGRESS NOTES
This is a 53 year old female for EVAL of FLU ILLNESS and she states SHE TESTED POSITIVE here in OFFICE for FLU and was WHEEZING    POS FOR INFLUENZA B  YESTERDAY    Denies SOB and DENIES CP to warrant ER at this time.  SHE BECAME ill TOO LONG AGO to start TAMIFLU as it was 5 days ago when her sxs started.    CURRENT Sxs are COUGH and MUCOUS and HEADACHE and body aches  Denies PLEURITIC PAIN      ROS is NEG for NAUSEA, VOMITING, DIARRHEA, CHEST PAIN, SOB, and BLEEDING and as further REVIEWED BELOW.    DUE TO LONG PAST the INTERVAL for TAMIFLU she is encouraged to HYDRATE with PEDIALYTE and take TYLENOL or ADVIL for aches and pains  MUCINEX OTC also prn    REPORT TO ER IF SOB or CP occurs.    SHE WILL NEED a NOTE FOR OFF WORK ALL WEEK THIS WEEK and until MONDAY next week to rest and recover.

## 2024-03-14 NOTE — PROGRESS NOTES
Randy Boo female   1970 53 y.o.   [id number]      Chief Complaint  Bilateral breast pain    History Of Present Illness  Randy Boo is a very pleasant 53 y.o. AA female [facility] by Kerry Arana for bilateral breast pain. She describes the pain as a daily tenderness. She denies trauma to the breast. She denies nipple discharge, fever or chills. She drinks caffeine in moderation and reports fatty foods. She does not smoke. She has a strong family history of breast cancer, see below. She has a history of left breast excisional biopsy, benign 20+ years ago. Per patient report, she underwent genetic testing with [facility] about 1-2 years ago, negative.     BREAST IMAGIN2023 Bilateral Full MRI, indicates BI-RADS Category 2. 3/20/2023 Bilateral screening mammogram, indicates BI-RADS Category 2.    REPRODUCTIVE HISTORY: menarche age 14, , first birth age 44,  x 10 months, OCP's x 2 years, menopause age unknown (hysterectomy age 46 due to benign fibroids), ovaries intact, scattered fibroglandular tissue    FAMILY CANCER HISTORY:   Mother: Breast cancer, age 78  Maternal Aunt: Breast cancer, age 70  Maternal Cousin (1): Breast cancer, age 35  Maternal Cousin (2): Breast cancer, age 69, BRCA negative     Surgical History  She has a past surgical history that includes  section, classic (2017); Other surgical history (01/15/2019); and Incisional breast biopsy (2018).     Social History  She reports that she has never smoked. She has never used smokeless tobacco. She reports that she does not drink alcohol and does not use drugs.    Family History  Family History   Problem Relation Name Age of Onset    Diabetes Mother      Heart disease Mother      Hypertension Mother      Breast cancer Mother      Mental illness Father      Dementia Father      Alzheimer's disease Father          Allergies  Iodinated contrast media;  Erythromycin base; and Latex, natural rubber    Medications  Current Outpatient Medications   Medication Instructions    acetaminophen (TylenoL) 325 mg tablet Every 4 hours    fish oil/borage/flax/om3,6,9 1 (OMEGA 3-6-9 ORAL) oral    multivitamin tablet 1 tablet, oral, Daily    naproxen (EC Naprosyn) 500 mg EC tablet oral, Every 12 hours    ondansetron (ZOFRAN) 4 mg, oral, Every 8 hours PRN         REVIEW OF SYSTEMS    Constitutional:  Negative for appetite change, fatigue, fever and unexpected weight change.   HENT:  Negative for ear pain, hearing loss, nosebleeds, sore throat and trouble swallowing.    Eyes:  Negative for discharge, itching and visual disturbance.   Respiratory:  Negative for cough, chest tightness and shortness of breath.    Cardiovascular:  Negative for chest pain, palpitations and leg swelling.   Breast: as indicated in HPI  Gastrointestinal:  Negative for abdominal pain, constipation, diarrhea and nausea.   Endocrine: Negative for cold intolerance and heat intolerance.   Genitourinary:  Negative for dysuria, frequency, hematuria, pelvic pain and vaginal bleeding.   Musculoskeletal:  Negative for arthralgias, back pain, gait problem, joint swelling and myalgias.   Skin:  Negative for color change and rash.   Allergic/Immunologic: Negative for environmental allergies and food allergies.   Neurological:  Negative for dizziness, tremors, speech difficulty, weakness, numbness and headaches.   Hematological:  Does not bruise/bleed easily.   Psychiatric/Behavioral:  Negative for agitation, dysphoric mood and sleep disturbance. The patient is not nervous/anxious.         Past Medical History  She has a past medical history of Concussion with loss of consciousness status unknown, initial encounter (05/31/2018), Contusion of left knee, initial encounter (01/16/2018), Contusion of right foot, initial encounter (06/01/2018), Iron deficiency, Kidney stone, Laceration without foreign body, right foot,  initial encounter (06/01/2018), Personal history of gestational diabetes (08/01/2019), Personal history of other benign neoplasm (11/27/2018), Personal history of other diseases of the female genital tract (11/27/2018), Personal history of urinary (tract) infections (05/15/2018), Supervision of elderly primigravida, unspecified trimester (09/30/2014), and Vulvar cyst (09/27/2018).     Physical Exam  Patient is alert and oriented x3 and in a relaxed and appropriate mood. Her gait is steady and hand grasps are equal. Sclera is clear. The breasts are nearly symmetrical. The tissue is soft without palpable abnormalities, discrete nodules or masses. The left breast has a well-healed vague excisional biopsy incision, inferior central quadrant. Both breasts are tender to touch. The skin and nipples appear normal. There is no cervical, supraclavicular or axillary lymphadenopathy. Heart rate and rhythm normal, S1 and S2 appreciated. The lungs are clear to auscultation bilaterally. Abdomen is soft and non-tender.       Physical Exam  Chest:              Last Recorded Vitals  Vitals:    03/25/24 1512   BP: 121/78   Pulse: 71       Relevant Results   Time was spent discussing digital images of the radiology testing with the patient. I explained the results in depth, along with suggested explanation for follow up recommendations based on the testing results. BI-RADS Category 2    Imaging    MR breast bilateral w contrast full protocol 12/27/2023    Narrative  Interpreted By:  Alie Islas,  STUDY:  BI MR BREAST BILATERAL WITH CONTRAST FULL PROTOCOL;  12/27/2023 9:16  pm    ACCESSION NUMBER(S):  [id number]    ORDERING CLINICIAN:  GAEL MENDEZ    INDICATION:  Bilateral breast tenderness. Family history of breast cancer.    COMPARISON:  Mammogram 03/20/2023    TECHNIQUE:  Using a dedicated breast coil, STIR axial and T1-weighted fat  saturation axial images of the breasts were obtained, the latter both  before and after  intravenous administration of Gadolinium DTPA. On an  independent workstation, 3-D images were formulated using [employer]  including time enhancement curves, subtraction images and MIP images.    Intravenous contrast:  12 mL of Dotarem    FINDINGS:  There is  symmetric  minimal bilateral background enhancement.    Density:  There are areas of scattered fibroglandular tissue.    RIGHT BREAST:  No suspicious mass or nonmass enhancement is  identified.    No axillary or internal mammary lymphadenopathy is appreciated.    LEFT BREAST: An enhancing 0.6 cm mass in the central medial breast at  mid depth (subtraction image of 124/244) corresponds to a  mammographically stable mass. It is bright on T2 weighted imaging. It  may represent an intramammary lymph node. No suspicious mass or  nonmass enhancement is identified.    No axillary or internal mammary lymphadenopathy is appreciated.    NON-BREAST FINDINGS:  None.    Impression  No MRI evidence of malignancy in either breast.  Clinical follow-up  is recommended for the patient's breast pain.    BI-RADS CATEGORY:  BI-RADS Category:  2 Benign.  Recommendation:  Routine Screening Mammogram in 1 Year.  Recommended Date:  1 Year.  Laterality:  Bilateral.    For any future breast imaging appointments, please call [phone]  ([phone]).      MACRO:  None      Signed by: Alie Islas 12/28/2023 9:23 AM  Dictation workstation:   [id number]       Assessment/Plan   Normal clinical exam, bilateral breast pain, family history of breast cancer, scattered fibroglandular tissue, BRCA negative    Plan: Breast pain education provided. Schedule bilateral annual mammogram, call if results are abnormal. Return in Fall 2024 for high risk evaluation.     Patient Discussion/Summary  Your clinical examination is normal. Please schedule your bilateral annual mammogram. I will call you if the results are abnormal. Please return in Fall 2024 for clinical exam and office visit or sooner if you  have any problems or concerns. We will discuss high risk evaluation.     You can see your health information, review clinical summaries from office visits & test results online when you follow your health with MY  Chart, a personal health record. To sign up go to [email or web address]. If you need assistance with signing up or trouble getting into your account call [facility] Patient Line 24/7 at [phone].    My office phone number is [phone] if you need to get in touch with me or have additional questions or concerns. Thank you for choosing [facility] and trusting me as your healthcare provider. I look forward to seeing you again at your next office visit. I am honored to be a provider on your health care team and I remain dedicated to helping you achieve your health goals.      Jennifer Pro, APRN-CNP

## 2024-03-21 ENCOUNTER — APPOINTMENT (OUTPATIENT)
Dept: SURGICAL ONCOLOGY | Facility: CLINIC | Age: 54
End: 2024-03-21
Payer: COMMERCIAL

## 2024-03-25 ENCOUNTER — OFFICE VISIT (OUTPATIENT)
Dept: SURGICAL ONCOLOGY | Facility: CLINIC | Age: 54
End: 2024-03-25
Payer: COMMERCIAL

## 2024-03-25 VITALS
HEART RATE: 71 BPM | SYSTOLIC BLOOD PRESSURE: 121 MMHG | WEIGHT: 176 LBS | DIASTOLIC BLOOD PRESSURE: 78 MMHG | BODY MASS INDEX: 30.05 KG/M2 | HEIGHT: 64 IN

## 2024-03-25 DIAGNOSIS — N64.4 BREAST PAIN, RIGHT: ICD-10-CM

## 2024-03-25 DIAGNOSIS — N64.4 BREAST PAIN, LEFT: ICD-10-CM

## 2024-03-25 DIAGNOSIS — Z12.31 SCREENING MAMMOGRAM FOR BREAST CANCER: Primary | ICD-10-CM

## 2024-03-25 PROCEDURE — 1036F TOBACCO NON-USER: CPT | Performed by: NURSE PRACTITIONER

## 2024-03-25 PROCEDURE — 99203 OFFICE O/P NEW LOW 30 MIN: CPT | Performed by: NURSE PRACTITIONER

## 2024-03-25 PROCEDURE — 99213 OFFICE O/P EST LOW 20 MIN: CPT | Performed by: NURSE PRACTITIONER

## 2024-03-25 RX ORDER — BISMUTH SUBSALICYLATE 262 MG
1 TABLET,CHEWABLE ORAL DAILY
COMMUNITY

## 2024-03-25 ASSESSMENT — PAIN SCALES - GENERAL: PAINLEVEL: 4

## 2024-03-25 NOTE — PATIENT INSTRUCTIONS
Your clinical examination is normal. Please schedule your bilateral annual mammogram. I will call you if the results are abnormal. Please return in Fall 2024 for clinical exam and office visit or sooner if you have any problems or concerns. We will discuss high risk evaluation.     You can see your health information, review clinical summaries from office visits & test results online when you follow your health with MY  Chart, a personal health record. To sign up go to [email or web address]. If you need assistance with signing up or trouble getting into your account call [employer] Patient Line 24/7 at [phone].    My office phone number is [phone] if you need to get in touch with me or have additional questions or concerns. Thank you for choosing [facility] and trusting me as your healthcare provider. I look forward to seeing you again at your next office visit. I am honored to be a provider on your health care team and I remain dedicated to helping you achieve your health goals.

## 2024-04-16 ENCOUNTER — HOSPITAL ENCOUNTER (OUTPATIENT)
Dept: RADIOLOGY | Facility: CLINIC | Age: 54
Discharge: HOME | End: 2024-04-16
Payer: COMMERCIAL

## 2024-04-16 VITALS — WEIGHT: 175.93 LBS | BODY MASS INDEX: 31.17 KG/M2 | HEIGHT: 63 IN

## 2024-04-16 DIAGNOSIS — Z12.31 SCREENING MAMMOGRAM FOR BREAST CANCER: ICD-10-CM

## 2024-04-16 PROCEDURE — 77063 BREAST TOMOSYNTHESIS BI: CPT | Performed by: STUDENT IN AN ORGANIZED HEALTH CARE EDUCATION/TRAINING PROGRAM

## 2024-04-16 PROCEDURE — 77067 SCR MAMMO BI INCL CAD: CPT

## 2024-04-16 PROCEDURE — 77067 SCR MAMMO BI INCL CAD: CPT | Performed by: STUDENT IN AN ORGANIZED HEALTH CARE EDUCATION/TRAINING PROGRAM

## 2024-05-09 ENCOUNTER — OFFICE VISIT (OUTPATIENT)
Dept: OPHTHALMOLOGY | Facility: CLINIC | Age: 54
End: 2024-05-09
Payer: COMMERCIAL

## 2024-05-09 ENCOUNTER — CLINICAL SUPPORT (OUTPATIENT)
Dept: OPHTHALMOLOGY | Facility: CLINIC | Age: 54
End: 2024-05-09
Payer: COMMERCIAL

## 2024-05-09 DIAGNOSIS — H25.13 AGE-RELATED NUCLEAR CATARACT, BILATERAL: Primary | ICD-10-CM

## 2024-05-09 DIAGNOSIS — H52.7 REFRACTIVE ERROR: ICD-10-CM

## 2024-05-09 PROCEDURE — 92015 DETERMINE REFRACTIVE STATE: CPT | Performed by: OPHTHALMOLOGY

## 2024-05-09 PROCEDURE — 99214 OFFICE O/P EST MOD 30 MIN: CPT | Performed by: OPHTHALMOLOGY

## 2024-05-09 RX ORDER — AMOXICILLIN 875 MG/1
TABLET, FILM COATED ORAL
COMMUNITY
Start: 2024-05-07 | End: 2024-05-28 | Stop reason: ALTCHOICE

## 2024-05-09 ASSESSMENT — KERATOMETRY
OS_AXISANGLE2_DEGREES: 180
OD_AXISANGLE2_DEGREES: 175
OS_K1POWER_DIOPTERS: 41.75
OS_K2POWER_DIOPTERS: 42.25
OS_AXISANGLE_DEGREES: 90
OD_K1POWER_DIOPTERS: 41.75
OD_K2POWER_DIOPTERS: 42.25
OD_AXISANGLE_DEGREES: 85

## 2024-05-09 ASSESSMENT — TONOMETRY
OS_IOP_MMHG: 14
IOP_METHOD: GOLDMANN APPLANATION
OD_IOP_MMHG: 14

## 2024-05-09 ASSESSMENT — VISUAL ACUITY
METHOD: SNELLEN - LINEAR
OD_CC: 20/20
OS_CC: 20/20
CORRECTION_TYPE: GLASSES

## 2024-05-09 ASSESSMENT — SLIT LAMP EXAM - LIDS
COMMENTS: 1+ BLEPHARITIS, 1+ DERMATOCHALASIS - UPPER LID
COMMENTS: 1+ BLEPHARITIS, 1+ DERMATOCHALASIS - UPPER LID

## 2024-05-09 ASSESSMENT — CUP TO DISC RATIO
OS_RATIO: 0.45
OD_RATIO: 0.45

## 2024-05-09 ASSESSMENT — PAIN SCALES - GENERAL: PAINLEVEL: 0-NO PAIN

## 2024-05-09 ASSESSMENT — ENCOUNTER SYMPTOMS
ALLERGIC/IMMUNOLOGIC NEGATIVE: 0
ENDOCRINE NEGATIVE: 0
CONSTITUTIONAL NEGATIVE: 0
MUSCULOSKELETAL NEGATIVE: 0
DEPRESSION: 0
NEUROLOGICAL NEGATIVE: 0
OCCASIONAL FEELINGS OF UNSTEADINESS: 0
RESPIRATORY NEGATIVE: 0
LOSS OF SENSATION IN FEET: 0
PSYCHIATRIC NEGATIVE: 0
EYES NEGATIVE: 0
GASTROINTESTINAL NEGATIVE: 0
CARDIOVASCULAR NEGATIVE: 0
HEMATOLOGIC/LYMPHATIC NEGATIVE: 0

## 2024-05-09 ASSESSMENT — REFRACTION_WEARINGRX
OS_AXIS: 042
OS_ADD: +2.25
OS_SPHERE: +0.00
OD_AXIS: 180
OD_ADD: +2.25
OS_CYLINDER: -0.25
OD_CYLINDER: +0.00
OD_SPHERE: +0.00

## 2024-05-09 ASSESSMENT — EXTERNAL EXAM - LEFT EYE: OS_EXAM: BROW PTOSIS

## 2024-05-09 ASSESSMENT — EXTERNAL EXAM - RIGHT EYE: OD_EXAM: BROW PTOSIS

## 2024-05-09 NOTE — PROGRESS NOTES
Subjective   Patient ID: Randy Boo is a 53 y.o. female.    Chief Complaint    Annual Exam; Blurred Vision       HPI       Blurred Vision    Context:  distance vision and near vision.             Comments    Complete exam.  No new changes in health history or meds.  Vision is good and unchanged.  Uses readers only.            Last edited by Wilfred Butterfield MD on 5/9/2024  4:31 PM.        No current outpatient medications on file. (Ophthalmology pharm classes)       Current Outpatient Medications (Other)   Medication Sig Dispense Refill    acetaminophen (TylenoL) 325 mg tablet every 4 hours.      amoxicillin (Amoxil) 875 mg tablet       fish oil/borage/flax/om3,6,9 1 (OMEGA 3-6-9 ORAL) Take by mouth.      multivitamin tablet Take 1 tablet by mouth once daily.      naproxen (EC Naprosyn) 500 mg EC tablet Take by mouth every 12 hours.      ondansetron (Zofran) 4 mg tablet Take 1 tablet (4 mg) by mouth every 8 hours if needed for nausea. 10 tablet 0       Objective   Base Eye Exam       Visual Acuity (Snellen - Linear)         Right Left Both    Dist cc 20/20 20/20     Near cc   J1+      Correction: Glasses              Tonometry (Goldmann Applanation, 4:05 PM)         Right Left    Pressure 14 14              Pupils         Dark Shape React APD    Right 4 Round 2 None    Left 4 Round 2 None              Extraocular Movement         Right Left     Full Full              Dilation       Both eyes: 1% Tropic 2.5% Phen @ 4:05 PM                  Additional Tests       Keratometry         K1 Axis K2 Axis    Right 41.75 175 42.25 85    Left 41.75 180 42.25 90                  Slit Lamp and Fundus Exam       External Exam         Right Left    External Brow ptosis Brow ptosis              Slit Lamp Exam         Right Left    Lids/Lashes 1+ Blepharitis, 1+ Dermatochalasis - upper lid 1+ Blepharitis, 1+ Dermatochalasis - upper lid    Conjunctiva/Sclera normal bulbar and palepbral conjunctiva normal bulbar and  palepbral conjunctiva    Cornea normal epi/stroma/endo and tear film normal epi/stroma/endo and tear film    Anterior Chamber ant. chamber deep and quiet ant. chamber deep and quiet    Iris iris normal iris normal    Lens 1+ nuclear sclerosis, Trace Cortical cataract 1+ nuclear sclerosis, Trace Cortical cataract    Anterior Vitreous vitreous syneresis vitreous syneresis              Fundus Exam         Right Left    Disc normal optic nerve normal optic nerve    C/D Ratio 0.45 0.45    Macula normal macula normal macula    Vessels normal retinal vessels normal retinal vessels    Periphery normal retinal periphery normal retinal periphery                  Refraction       Wearing Rx         Sphere Cylinder Axis Add    Right +0.00 +0.00 180 +2.25    Left +0.00 -0.25 042 +2.25              Final Rx         Sphere Cylinder [place] Dist VA Add Near VA    Right +0.00 -0.25 180 20/20 +2.50 20/20    Left +0.25 -0.25 040 20/20 +2.50 20/20      Expiration Date: 5/9/2026    Pupillary Distance: 69                    Assessment/Plan   Problem List Items Addressed This Visit          Eye/Vision problems    Age-related nuclear cataract, bilateral - Primary     F/u 2-3 years full.           Refractive error     Readers only.

## 2024-05-10 ENCOUNTER — APPOINTMENT (OUTPATIENT)
Dept: OPHTHALMOLOGY | Facility: CLINIC | Age: 54
End: 2024-05-10
Payer: COMMERCIAL

## 2024-05-16 ENCOUNTER — APPOINTMENT (OUTPATIENT)
Dept: PRIMARY CARE | Facility: CLINIC | Age: 54
End: 2024-05-16
Payer: COMMERCIAL

## 2024-05-25 NOTE — PROGRESS NOTES
This is a 53 year old female for EVAL of LEFT KNEE PAIN chronic INTERMITTENT since HIGH SCHOOL.    Had remote injury to KNEE but feels it may be exacerbated by her OBESITY and she states she had a HOME VISIT by  for [facility]     Lab Results   Component Value Date    HGBA1C 5.7 (H) 12/15/2023           ROS is NEG for HEADACHE, NAUSEA, VOMITING, DIARRHEA, CHEST PAIN, SOB, and BLEEDING and as further REVIEWED BELOW.    Subjective   Randy Boo is a 53 y.o. female who presents for Pain (Knees) and paperwork (Bringing in FMLA and EEO next time to fill out with MD).    HPI:    Per nursing intake, pt here for Pain (Knees) and paperwork (Bringing in FMLA and EEO next time to fill out with MD)       Review of systems is essentially negative for all systems except for any identified issues in HPI above.    Objective     /82   Pulse 82   Temp 36.2 °C (97.2 °F)   Wt 82 kg (180 lb 12.8 oz)   SpO2 100%   BMI 32.04 kg/m²      Physical Examination:       GENERAL           General Appearance: well-appearing, well-developed, well-hydrated, well-nourished, no acute distress.        HEENT           NECK supple, no masses or thyromegaly, no carotid bruit.        EYES           Extraocular Movements: normal, bilateral eyes HUONG, no conjunctival injection.        HEART           Rate and Rhythm regular rate and rhythm. Heart sounds: normal S1S2, no S3 or S4. Murmurs: none.        CHEST           Breath sounds: Clear to IPPA, RR<16 no use of accessory muscles.        ABDOMEN           General: Neg for LKKS or masses, no scleral icterus or jaundice.        MUSCULOSKELETAL           Joints Demonstration: Neg for erythema, swelling or joint deformities. gross abnormalities no gross abnormalities.        EXTREMITIES           Lower Extremities: Neg for cyanosis, clubbing or edema.       Assessment/Plan   Please set up a FU visit once the Xrs knees are completed and we can consider if you wish to entertain any  WEIGHT LOSS MEDS for OBESITY BMI 32.04  Problem List Items Addressed This Visit    None  Visit Diagnoses       Knee pain, unspecified chronicity, unspecified laterality    -  Primary    Relevant Orders    XR knee left 1-2 views    XR knee right 1-2 views    Health counseling        Immunization counseling        Screening mammogram for breast cancer        Obesity due to excess calories, unspecified classification, unspecified whether serious comorbidity present        Antibody response exam        Relevant Orders    Measles (Rubeola) Antibody, IgG    Mumps Antibody, IgG    Rubella antibody, IgG    Hepatitis B surface antibody            FOLLOW UP:  PRN and as specified above         Kerry Hollingsworth M.D.

## 2024-05-28 ENCOUNTER — OFFICE VISIT (OUTPATIENT)
Dept: PRIMARY CARE | Facility: CLINIC | Age: 54
End: 2024-05-28
Payer: COMMERCIAL

## 2024-05-28 VITALS
BODY MASS INDEX: 32.04 KG/M2 | HEART RATE: 82 BPM | DIASTOLIC BLOOD PRESSURE: 82 MMHG | TEMPERATURE: 97.2 F | OXYGEN SATURATION: 100 % | SYSTOLIC BLOOD PRESSURE: 124 MMHG | WEIGHT: 180.8 LBS

## 2024-05-28 DIAGNOSIS — E66.09 OBESITY DUE TO EXCESS CALORIES, UNSPECIFIED CLASSIFICATION, UNSPECIFIED WHETHER SERIOUS COMORBIDITY PRESENT: ICD-10-CM

## 2024-05-28 DIAGNOSIS — Z12.31 SCREENING MAMMOGRAM FOR BREAST CANCER: ICD-10-CM

## 2024-05-28 DIAGNOSIS — Z00.00 ROUTINE MEDICAL EXAM: ICD-10-CM

## 2024-05-28 DIAGNOSIS — M25.569 KNEE PAIN, UNSPECIFIED CHRONICITY, UNSPECIFIED LATERALITY: Primary | ICD-10-CM

## 2024-05-28 DIAGNOSIS — Z71.85 IMMUNIZATION COUNSELING: ICD-10-CM

## 2024-05-28 DIAGNOSIS — Z01.84 ANTIBODY RESPONSE EXAM: ICD-10-CM

## 2024-05-28 DIAGNOSIS — Z71.9 HEALTH COUNSELING: ICD-10-CM

## 2024-05-28 PROCEDURE — 90677 PCV20 VACCINE IM: CPT | Performed by: FAMILY MEDICINE

## 2024-05-28 PROCEDURE — 99214 OFFICE O/P EST MOD 30 MIN: CPT | Performed by: FAMILY MEDICINE

## 2024-05-28 PROCEDURE — 1036F TOBACCO NON-USER: CPT | Performed by: FAMILY MEDICINE

## 2024-05-28 ASSESSMENT — COLUMBIA-SUICIDE SEVERITY RATING SCALE - C-SSRS
6. HAVE YOU EVER DONE ANYTHING, STARTED TO DO ANYTHING, OR PREPARED TO DO ANYTHING TO END YOUR LIFE?: NO
2. HAVE YOU ACTUALLY HAD ANY THOUGHTS OF KILLING YOURSELF?: NO
1. IN THE PAST MONTH, HAVE YOU WISHED YOU WERE DEAD OR WISHED YOU COULD GO TO SLEEP AND NOT WAKE UP?: NO

## 2024-05-28 ASSESSMENT — PAIN SCALES - GENERAL: PAINLEVEL: 8

## 2024-07-10 ENCOUNTER — TELEPHONE (OUTPATIENT)
Dept: PRIMARY CARE | Facility: CLINIC | Age: 54
End: 2024-07-10
Payer: COMMERCIAL

## 2024-07-10 NOTE — TELEPHONE ENCOUNTER
Patient didn't want to cancel but her son just double eye surgery and the next available is 8/13 and she would to know if you can see her sooner. She was on for July 11, 2024@1pm. Son also has a follow up appointment tomorrow follow up from his eye surgery.

## 2024-07-11 ENCOUNTER — HOSPITAL ENCOUNTER (OUTPATIENT)
Dept: RADIOLOGY | Facility: CLINIC | Age: 54
Discharge: HOME | End: 2024-07-11
Payer: COMMERCIAL

## 2024-07-11 ENCOUNTER — APPOINTMENT (OUTPATIENT)
Dept: UROLOGY | Facility: CLINIC | Age: 54
End: 2024-07-11
Payer: COMMERCIAL

## 2024-07-11 ENCOUNTER — LAB (OUTPATIENT)
Dept: LAB | Facility: LAB | Age: 54
End: 2024-07-11
Payer: COMMERCIAL

## 2024-07-11 DIAGNOSIS — M25.569 KNEE PAIN, UNSPECIFIED CHRONICITY, UNSPECIFIED LATERALITY: ICD-10-CM

## 2024-07-11 DIAGNOSIS — Z01.84 ANTIBODY RESPONSE EXAM: ICD-10-CM

## 2024-07-11 PROCEDURE — 36415 COLL VENOUS BLD VENIPUNCTURE: CPT

## 2024-07-11 PROCEDURE — 73562 X-RAY EXAM OF KNEE 3: CPT | Mod: RT

## 2024-07-11 PROCEDURE — 73562 X-RAY EXAM OF KNEE 3: CPT | Mod: RIGHT SIDE | Performed by: RADIOLOGY

## 2024-07-11 PROCEDURE — 86317 IMMUNOASSAY INFECTIOUS AGENT: CPT

## 2024-07-11 PROCEDURE — 86765 RUBEOLA ANTIBODY: CPT

## 2024-07-11 PROCEDURE — 73562 X-RAY EXAM OF KNEE 3: CPT | Mod: LT

## 2024-07-11 PROCEDURE — 86735 MUMPS ANTIBODY: CPT

## 2024-07-11 PROCEDURE — 86706 HEP B SURFACE ANTIBODY: CPT

## 2024-07-12 LAB
HBV SURFACE AB SER-ACNC: 18.2 MIU/ML
MEV IGG SER QL IA: NEGATIVE
MUMPS IGG ANTIBODY INDEX: 2.9 IA
MUV IGG SER IA-ACNC: POSITIVE
RUBEOLA IGG ANTIBODY INDEX: 0.6 IA
RUBV IGG SERPL IA-ACNC: 3.6 IA
RUBV IGG SERPL QL IA: POSITIVE

## 2024-07-19 ENCOUNTER — APPOINTMENT (OUTPATIENT)
Dept: RADIOLOGY | Facility: CLINIC | Age: 54
End: 2024-07-19
Payer: COMMERCIAL

## 2024-07-24 ENCOUNTER — APPOINTMENT (OUTPATIENT)
Dept: RADIOLOGY | Facility: CLINIC | Age: 54
End: 2024-07-24
Payer: COMMERCIAL

## 2024-07-31 ENCOUNTER — HOSPITAL ENCOUNTER (OUTPATIENT)
Dept: RADIOLOGY | Facility: CLINIC | Age: 54
Discharge: HOME | End: 2024-07-31
Payer: COMMERCIAL

## 2024-07-31 DIAGNOSIS — N20.0 KIDNEY STONES: ICD-10-CM

## 2024-07-31 PROCEDURE — 76770 US EXAM ABDO BACK WALL COMP: CPT

## 2024-07-31 PROCEDURE — 76770 US EXAM ABDO BACK WALL COMP: CPT | Performed by: RADIOLOGY

## 2024-08-02 ENCOUNTER — TELEPHONE (OUTPATIENT)
Dept: PRIMARY CARE | Facility: CLINIC | Age: 54
End: 2024-08-02

## 2024-08-02 ENCOUNTER — TELEMEDICINE (OUTPATIENT)
Dept: PRIMARY CARE | Facility: CLINIC | Age: 54
End: 2024-08-02
Payer: COMMERCIAL

## 2024-08-02 DIAGNOSIS — N20.0 NEPHROLITHIASIS: Primary | ICD-10-CM

## 2024-08-02 DIAGNOSIS — U07.1 COVID: Primary | ICD-10-CM

## 2024-08-02 DIAGNOSIS — R51.9 NONINTRACTABLE HEADACHE, UNSPECIFIED CHRONICITY PATTERN, UNSPECIFIED HEADACHE TYPE: ICD-10-CM

## 2024-08-02 PROCEDURE — 1036F TOBACCO NON-USER: CPT | Performed by: FAMILY MEDICINE

## 2024-08-02 PROCEDURE — 99442 PR PHYS/QHP TELEPHONE EVALUATION 11-20 MIN: CPT | Performed by: FAMILY MEDICINE

## 2024-08-02 ASSESSMENT — ENCOUNTER SYMPTOMS
OCCASIONAL FEELINGS OF UNSTEADINESS: 0
DEPRESSION: 0
LOSS OF SENSATION IN FEET: 0

## 2024-08-02 NOTE — PROGRESS NOTES
"PT states she has a really bad headache and has Tylenol extra strength and mucinex, but is unsure of how much she can/should take. PT states she has kidney issues and wants to make sure it is ok to take both. PT scheduled for televisit this afternoon 8/2/2024 @ 1:15 PM     NOTE ABOVE from [facility] STAFF INTAKE NOTES for VISIT indicate POS COVID    SHE VERIFIES she has a POSITIVE HOME COVID TEST and attends [facility] and was Dx there and was told it was allergies and states \"It is the second time I have misdiagnosed\"   She went there and was told allergies yesterday and then tested at HOME as they did not do a test and POS for COVID and is FULLY IMMUNIZED and boosted reasonably UTD for seasonal updates   SHE DOES back track and indicates she may have had this illness longer than just a day ago when offered and encouraged to consider PAXLOVID she declines PAXLOVID    NO CP or SOB to warrant ER evaluation  HEADACHE is not severe just body aches and headache expected with COVID     SHE HAS Hx RENAL STONE but not CKD and CREATININE and eGFR are excellent in Dec 2023    SHE DECLINES PAXLOVID in any case          REST   HYDRATE with PEDIALYTE  USE OTC VIT D at 5 K units daily    REPORT TO ER if sxs worsen    HAD COUGH but improved.    SCHEDULED FOR TELE VISIT re CONCERN for \"REALLY BAD HEADACHE\" and wishes instruction to take TYLENOL and MUCINEX in light of potential renal issues but last CMP shows NO RENAL ISSUES as below copied from LATE 2023 results CREATININE and eGFR totally NORMAL:      Comprehensive metabolic panel  Order: [id number]   Status: Final result       Visible to patient: Yes (not seen)       Dx: Routine medical exam    3 Result Notes            Component  Ref Range & Units 7 mo ago  (12/15/23) 1 yr ago  (5/5/23) 1 yr ago  (3/13/23) 1 yr ago  (3/13/23) 1 yr ago  (8/26/22) 2 yr ago  (6/2/22) 2 yr ago  (11/5/21)   Glucose  74 - 99 mg/dL 77 78 R  92 R 99 R 116 High  R 92 R   Sodium  136 - 145 mmol/L 143 " 142 R  141 R 139 R 139 R 136 R   Potassium  3.5 - 5.3 mmol/L 4.3 3.9 R  3.6 R 3.9 R 3.7 R 4.1 R   Comment: MILD HEMOLYSIS DETECTED. The result may be falsely elevated due to hemolysis or other interferents. Clinical correlation is recommended. Repeat testing may be considered.   Chloride  98 - 107 mmol/L 106 105 R  106 R 101 R 102 R 102 R   Bicarbonate  21 - 32 mmol/L 28 26 R  25 R 27 R 26 R 27 R   Anion Gap  10 - 20 mmol/L 13 11 R  10 R 11 R 11 R 7 R   Urea Nitrogen  6 - 23 mg/dL 13 11 R  8 R 9 R 13 R 12 R   Creatinine  0.50 - 1.05 mg/dL 0.66 0.8 R  0.8 R 0.7 R 0.8 R 0.8 R   eGFR  >60 mL/min/1.73m*2 >90         Comment: Calculations of estimated GFR are performed using the 2021 CKD-EPI Study Refit equation without the race variable for the IDMS-Traceable creatinine methods.  https://jasn.asnjournals.org/content/early/2021/09/22/ASN.5818035946   Calcium  8.6 - 10.6 mg/dL 9.7 8.9 R  8.7 R 9.6 R 8.8 R 9.4 R   Albumin  3.4 - 5.0 g/dL 4.3  3.8 R  4.6 R 4.2 R    Alkaline Phosphatase  33 - 110 U/L 67  68 R  68 R 56 R    Total Protein  6.4 - 8.2 g/dL 7.8  7.0 R  7.8 R 6.9 R    AST  9 - 39 U/L 19  19 R  16 R 13 R    Comment: MILD HEMOLYSIS DETECTED. The result may be falsely elevated due to hemolysis or other interferents. Clinical correlation is recommended. Repeat testing may be considered.   Bilirubin, Total  0.0 - 1.2 mg/dL 0.4  0.4 R  0.6 R 0.4 R    ALT  7 - 45 U/L 18

## 2024-08-02 NOTE — TELEPHONE ENCOUNTER
Pt left voicemail stating she has covid and needs advice on what medications to take. Pt will need televisit to discuss with PCP.

## 2024-08-02 NOTE — TELEPHONE ENCOUNTER
PT states she has a really bad headache and has Tylenol extra strength and mucinex, but is unsure of how much she can/should take. PT states she has kidney issues and wants to make sure it is ok to take both. PT scheduled for televisit this afternoon 8/2/2024 @ 1:15 PM

## 2024-08-08 ENCOUNTER — TELEPHONE (OUTPATIENT)
Dept: PRIMARY CARE | Facility: CLINIC | Age: 54
End: 2024-08-08

## 2024-08-08 NOTE — TELEPHONE ENCOUNTER
Amalia Padilla is requesting to see you at the [facility] location in [place], but I scheduled her here at the [facility] office.

## 2024-08-09 ENCOUNTER — TELEPHONE (OUTPATIENT)
Dept: PRIMARY CARE | Facility: CLINIC | Age: 54
End: 2024-08-09
Payer: COMMERCIAL

## 2024-08-09 NOTE — TELEPHONE ENCOUNTER
PT calling stating that she had a televisit 8/2/2024 for covid positive. PT requesting letter stating she can return to work Monday 8/12/2024. Please call PT at [phone]

## 2024-09-05 NOTE — PROGRESS NOTES
Randy Boo female   1970 54 y.o.   [id number]      Chief Complaint  High risk evaluation    History Of Present Illness  Randy Boo is a very pleasant 54 y.o. AA female [facility]. She has a strong family history of breast cancer, see below. She has a history of left breast excisional biopsy, benign 20+ years ago. Per patient report, she underwent genetic testing with [employer], negative. She presents today to discuss high risk surveillance. She is here with her son, Raheel. She denies any new masses or lumps. She states the breast pain has improved.     BREAST IMAGIN2024 Bilateral screening mammogram, indicates BI-RADS Category 2. 2023 Bilateral Full MRI, indicates BI-RADS Category 2.     REPRODUCTIVE HISTORY: menarche age 14, , first birth age 44,  x 10 months, OCP's x 2 years, menopause age unknown (hysterectomy age 46 due to benign fibroids), ovaries intact, scattered fibroglandular tissue    FAMILY CANCER HISTORY:   Mother: Breast cancer, age 78  Maternal Aunt: Breast cancer, age 70  Maternal Cousin (1): Breast cancer, age 35  Maternal Cousin (2): Breast cancer, age 69, BRCA negative     Surgical History  She has a past surgical history that includes  section, classic (2017); Other surgical history (01/15/2019); and Incisional breast biopsy (2018).     Social History  She reports that she has never smoked. She has never been exposed to tobacco smoke. She has never used smokeless tobacco. She reports that she does not drink alcohol and does not use drugs.    Family History  Family History   Problem Relation Name Age of Onset    Diabetes Mother      Heart disease Mother      Hypertension Mother      Breast cancer Mother      Mental illness Father      Dementia Father      Alzheimer's disease Father          Allergies  Iodinated contrast media; Erythromycin base; and Latex, natural  rubber    Medications  Current Outpatient Medications   Medication Instructions    acetaminophen (TylenoL) 325 mg tablet Every 4 hours    fish oil/borage/flax/om3,6,9 1 (OMEGA 3-6-9 ORAL) oral    multivitamin tablet 1 tablet, oral, Daily    naproxen (EC Naprosyn) 500 mg EC tablet oral, Every 12 hours    ondansetron (ZOFRAN) 4 mg, oral, Every 8 hours PRN         REVIEW OF SYSTEMS    Constitutional:  Negative for appetite change, fatigue, fever and unexpected weight change.   HENT:  Negative for ear pain, hearing loss, nosebleeds, sore throat and trouble swallowing.    Eyes:  Negative for discharge, itching and visual disturbance.   Respiratory:  Negative for cough, chest tightness and shortness of breath.    Cardiovascular:  Negative for chest pain, palpitations and leg swelling.   Breast: as indicated in HPI  Gastrointestinal:  Negative for abdominal pain, constipation, diarrhea and nausea.   Endocrine: Negative for cold intolerance and heat intolerance.   Genitourinary:  Negative for dysuria, frequency, hematuria, pelvic pain and vaginal bleeding.   Musculoskeletal:  Negative for arthralgias, back pain, gait problem, joint swelling and myalgias.   Skin:  Negative for color change and rash.   Allergic/Immunologic: Negative for environmental allergies and food allergies.   Neurological:  Negative for dizziness, tremors, speech difficulty, weakness, numbness and headaches.   Hematological:  Does not bruise/bleed easily.   Psychiatric/Behavioral:  Negative for agitation, dysphoric mood and sleep disturbance. The patient is not nervous/anxious.         Past Medical History  She has a past medical history of Age-related nuclear cataract, bilateral, Concussion with loss of consciousness status unknown, initial encounter (05/31/2018), Contusion of left knee, initial encounter (01/16/2018), Contusion of right foot, initial encounter (06/01/2018), Dry eyes, Iron deficiency, Kidney stone, Laceration without foreign body,  right foot, initial encounter (06/01/2018), Personal history of gestational diabetes (08/01/2019), Personal history of other benign neoplasm (11/27/2018), Personal history of other diseases of the female genital tract (11/27/2018), Personal history of urinary (tract) infections (05/15/2018), Supervision of elderly primigravida, unspecified trimester ([facility]) (09/30/2014), and Vulvar cyst (09/27/2018).     Physical Exam  Patient is alert and oriented x3 and in a relaxed and appropriate mood. Her gait is steady and hand grasps are equal. Sclera is clear. The breasts are nearly symmetrical. The tissue is dense, left> right without palpable abnormalities, discrete nodules or masses. The left breast has a well-healed vague excisional biopsy incision, inferior central quadrant. The skin and nipples appear normal. There is no cervical, supraclavicular or axillary lymphadenopathy. Heart rate and rhythm normal, S1 and S2 appreciated. The lungs are clear to auscultation bilaterally. Abdomen is soft and non-tender.       Physical Exam  Chest:              Relevant Results   Time was spent discussing digital images of the radiology testing with the patient. I explained the results in depth, along with suggested explanation for follow up recommendations based on the testing results. BI-RADS Category 2    Imaging  Narrative & Impression   Interpreted By:  Surinder Evans,   STUDY:  BI MAMMO BILATERAL SCREENING TOMOSYNTHESIS;  4/16/2024 4:32 pm      ACCESSION NUMBER(S):  [id number]      ORDERING CLINICIAN:  TINO JUNIOR      INDICATION:  Screening.      COMPARISON:  03/20/2023 and 08/22/2022      FINDINGS:  2D and tomosynthesis images were reviewed at 1 mm slice thickness.      Density:  There are areas of scattered fibroglandular tissue.      Stable benign left breast mass. No suspicious masses or  calcifications are identified.      IMPRESSION:  No mammographic evidence of malignancy.      BI-RADS CATEGORY:      BI-RADS  Category:  2 Benign.  Recommendation:  Annual Screening.  Recommended Date:  1 Year.  Laterality:  Bilateral.     MR breast bilateral w contrast full protocol 12/27/2023    Narrative  Interpreted By:  Alie Islas,  STUDY:  BI MR BREAST BILATERAL WITH CONTRAST FULL PROTOCOL;  12/27/2023 9:16  pm    ACCESSION NUMBER(S):  [id number]    ORDERING CLINICIAN:  GAEL MENDEZ    INDICATION:  Bilateral breast tenderness. Family history of breast cancer.    COMPARISON:  Mammogram 03/20/2023    TECHNIQUE:  Using a dedicated breast coil, STIR axial and T1-weighted fat  saturation axial images of the breasts were obtained, the latter both  before and after intravenous administration of Gadolinium DTPA. On an  independent workstation, 3-D images were formulated using Status4  including time enhancement curves, subtraction images and MIP images.    Intravenous contrast:  12 mL of Dotarem    FINDINGS:  There is  symmetric  minimal bilateral background enhancement.    Density:  There are areas of scattered fibroglandular tissue.    RIGHT BREAST:  No suspicious mass or nonmass enhancement is  identified.    No axillary or internal mammary lymphadenopathy is appreciated.    LEFT BREAST: An enhancing 0.6 cm mass in the central medial breast at  mid depth (subtraction image of 124/244) corresponds to a  mammographically stable mass. It is bright on T2 weighted imaging. It  may represent an intramammary lymph node. No suspicious mass or  nonmass enhancement is identified.    No axillary or internal mammary lymphadenopathy is appreciated.    NON-BREAST FINDINGS:  None.    Impression  No MRI evidence of malignancy in either breast.  Clinical follow-up  is recommended for the patient's breast pain.    BI-RADS CATEGORY:  BI-RADS Category:  2 Benign.  Recommendation:  Routine Screening Mammogram in 1 Year.  Recommended Date:  1 Year.  Laterality:  Bilateral.    For any future breast imaging appointments, please call  [phone]  [phone]).      MACRO:  None      Signed by: Alie Islas 12/28/2023 9:23 AM  Dictation workstation:   [id number]      Risk profiles        Assessment/Plan   Normal clinical exam, high risk surveillance care, family history of breast cancer, scattered fibroglandular tissue, BRCA negative    Plan: Return in April 2025 for bilateral screening mammogram and office visit. Rerun risk profiles at next visit due to unknown biopsy results for Dora Salinas. Full MRI in December 2024. Discussed Tamoxifen for risk reduction. Will discuss further at next visit once risk are calculated. FSH/LH and Estradiol ordered for menopause status.     Patient Discussion/Summary  Your clinical examination is normal. Please return in April 2025 for bilateral screening mammogram and office visit or sooner if you have any problems or concerns.     High risk breast surveillance care plan:  Yearly mammogram with digital breast tomosynthesis  Twice yearly clinical breast examinations  Breast MRI - Full MRI in December 2024  Monthly self breast examinations  Vitamin D3 2000 IU/daily (over the counter)  Exercise 3-4 times per week for 45-60 minutes  Limit alcohol to 3-4 drinks per week   Eat a healthy low-fat diet with lots of fruits and vegetables    You can see your health information, review clinical summaries from office visits & test results online when you follow your health with MY  Chart, a personal health record. To sign up go to [email or web address]. If you need assistance with signing up or trouble getting into your account call [employer] Patient Line 24/7 at [phone].    My office phone number is [phone] if you need to get in touch with me or have additional questions or concerns. Thank you for choosing [facility] and trusting me as your healthcare provider. I look forward to seeing you again at your next office visit. I am honored to be a provider on your health care team and I remain dedicated  to helping you achieve your health goals.      Jennifer Pro, APRN-CNP

## 2024-09-12 ENCOUNTER — APPOINTMENT (OUTPATIENT)
Dept: UROLOGY | Facility: CLINIC | Age: 54
End: 2024-09-12
Payer: COMMERCIAL

## 2024-09-18 ENCOUNTER — APPOINTMENT (OUTPATIENT)
Dept: UROLOGY | Facility: CLINIC | Age: 54
End: 2024-09-18
Payer: COMMERCIAL

## 2024-09-18 DIAGNOSIS — N20.0 KIDNEY STONES: Primary | ICD-10-CM

## 2024-09-18 PROCEDURE — 1036F TOBACCO NON-USER: CPT | Performed by: PHYSICIAN ASSISTANT

## 2024-09-18 PROCEDURE — 99214 OFFICE O/P EST MOD 30 MIN: CPT | Performed by: PHYSICIAN ASSISTANT

## 2024-09-18 ASSESSMENT — ENCOUNTER SYMPTOMS
ENDOCRINE NEGATIVE: 1
ALLERGIC/IMMUNOLOGIC NEGATIVE: 1
GASTROINTESTINAL NEGATIVE: 1
EYES NEGATIVE: 1
NEUROLOGICAL NEGATIVE: 1
PSYCHIATRIC NEGATIVE: 1
CONSTITUTIONAL NEGATIVE: 1
CARDIOVASCULAR NEGATIVE: 1
MUSCULOSKELETAL NEGATIVE: 1
RESPIRATORY NEGATIVE: 1
HEMATOLOGIC/LYMPHATIC NEGATIVE: 1

## 2024-09-18 NOTE — PROGRESS NOTES
Subjective   Patient ID: Randy Boo is a 54 y.o. female who presents for Follow-up (Kidney stones/MRI).  HPI  Patient is a 52 yo female with renal stones presents for follow-up to review renal ultrasound results.    Patient is doing well.  She has been trying to increase her fluids and increase lemon in her water.  She is drinking about 40 ounces of water a day.  She denies flank pain, hematuria, dysuria, or passage of stone.  She denies urinary frequency or urgency.    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  It is    Renal ultrasound evidence of a 2 mm stone in the left kidney.    === 07/31/24 ===    US RENAL COMPLETE    - Impression -  Suspected small nonobstructive calculus left kidney midzone.    Signed by: Atif Streeter 8/1/2024 11:41 PM  Dictation workstation:   [id number]        Review of Systems   Constitutional: Negative.    HENT: Negative.     Eyes: Negative.    Respiratory: Negative.     Cardiovascular: Negative.    Gastrointestinal: Negative.    Endocrine: Negative.    Genitourinary: Negative.    Musculoskeletal: Negative.    Skin: Negative.    Allergic/Immunologic: Negative.    Neurological: Negative.    Hematological: Negative.    Psychiatric/Behavioral: Negative.         Objective   Physical Exam  Constitutional:       General: She is not in acute distress.     Appearance: Normal appearance.   HENT:      Head: Normocephalic and atraumatic.      Nose: Nose normal.      Mouth/Throat:      Mouth: Mucous membranes are dry.   Cardiovascular:      Rate and Rhythm: Normal rate.   Pulmonary:      Effort: Pulmonary effort is normal.   Abdominal:      General: Abdomen is flat.      Palpations: Abdomen is soft.   Musculoskeletal:         General: Normal range of motion.      Cervical back: Normal range of motion and neck supple.   Skin:     General: Skin is warm and dry.   Neurological:      General: No focal deficit present.      Mental Status:  She is alert and oriented to person, place, and time.   Psychiatric:         Mood and Affect: Mood normal.         Assessment/Plan   Problem List Items Addressed This Visit             ICD-10-CM    Kidney stones - Primary N20.0    Relevant Orders    US renal complete     Continue current management.  I discussed stone prevention by increasing fluid intake to about 2-1/2 L of fluid a day.  Continue adding lemon to the water.  Decrease salt to no greater than 2 g a day.    Follow-up in 6 months with a prior renal ultrasound or sooner as needed       Elia Hale PA-C 09/18/24 4:13 PM

## 2024-09-23 ENCOUNTER — OFFICE VISIT (OUTPATIENT)
Dept: SURGICAL ONCOLOGY | Facility: CLINIC | Age: 54
End: 2024-09-23
Payer: COMMERCIAL

## 2024-09-23 DIAGNOSIS — Z12.39 BREAST CANCER SCREENING, HIGH RISK PATIENT: Primary | ICD-10-CM

## 2024-09-23 DIAGNOSIS — N95.1 MENOPAUSAL SYMPTOMS: ICD-10-CM

## 2024-09-23 DIAGNOSIS — Z12.31 SCREENING MAMMOGRAM FOR BREAST CANCER: ICD-10-CM

## 2024-09-23 PROCEDURE — 99214 OFFICE O/P EST MOD 30 MIN: CPT | Performed by: NURSE PRACTITIONER

## 2024-09-23 PROCEDURE — 1036F TOBACCO NON-USER: CPT | Performed by: NURSE PRACTITIONER

## 2024-09-23 ASSESSMENT — PATIENT HEALTH QUESTIONNAIRE - PHQ9
1. LITTLE INTEREST OR PLEASURE IN DOING THINGS: NOT AT ALL
2. FEELING DOWN, DEPRESSED OR HOPELESS: NOT AT ALL
SUM OF ALL RESPONSES TO PHQ9 QUESTIONS 1 & 2: 0

## 2024-09-23 NOTE — PATIENT INSTRUCTIONS
Your clinical examination is normal. Please return in April 2025 for bilateral screening mammogram and office visit or sooner if you have any problems or concerns.     High risk breast surveillance care plan:  Yearly mammogram with digital breast tomosynthesis  Twice yearly clinical breast examinations  Breast MRI - Full MRI in December 2024  Monthly self breast examinations  Vitamin D3 2000 IU/daily (over the counter)  Exercise 3-4 times per week for 45-60 minutes  Limit alcohol to 3-4 drinks per week   Eat a healthy low-fat diet with lots of fruits and vegetables    You can see your health information, review clinical summaries from office visits & test results online when you follow your health with MY  Chart, a personal health record. To sign up go to [email or web address]. If you need assistance with signing up or trouble getting into your account call [employer] Patient Line 24/7 at [phone].    My office phone number is [phone] if you need to get in touch with me or have additional questions or concerns. Thank you for choosing [facility] and trusting me as your healthcare provider. I look forward to seeing you again at your next office visit. I am honored to be a provider on your health care team and I remain dedicated to helping you achieve your health goals.

## 2024-11-22 ENCOUNTER — LAB (OUTPATIENT)
Dept: LAB | Facility: LAB | Age: 54
End: 2024-11-22
Payer: COMMERCIAL

## 2024-11-22 DIAGNOSIS — N95.1 MENOPAUSAL SYMPTOMS: ICD-10-CM

## 2024-11-22 LAB
ESTRADIOL SERPL-MCNC: 623 PG/ML
FSH SERPL-ACNC: 15.7 IU/L
LH SERPL-ACNC: 17 IU/L

## 2024-11-22 PROCEDURE — 83001 ASSAY OF GONADOTROPIN (FSH): CPT

## 2024-11-22 PROCEDURE — 36415 COLL VENOUS BLD VENIPUNCTURE: CPT

## 2024-11-22 PROCEDURE — 82670 ASSAY OF TOTAL ESTRADIOL: CPT

## 2024-11-22 PROCEDURE — 83002 ASSAY OF GONADOTROPIN (LH): CPT

## 2025-03-19 ENCOUNTER — APPOINTMENT (OUTPATIENT)
Dept: UROLOGY | Facility: CLINIC | Age: 55
End: 2025-03-19
Payer: COMMERCIAL

## 2025-03-21 ENCOUNTER — HOSPITAL ENCOUNTER (OUTPATIENT)
Dept: RADIOLOGY | Facility: CLINIC | Age: 55
Discharge: HOME | End: 2025-03-21
Payer: COMMERCIAL

## 2025-03-21 DIAGNOSIS — N20.0 KIDNEY STONES: ICD-10-CM

## 2025-03-21 PROCEDURE — 76770 US EXAM ABDO BACK WALL COMP: CPT | Performed by: STUDENT IN AN ORGANIZED HEALTH CARE EDUCATION/TRAINING PROGRAM

## 2025-03-21 PROCEDURE — 76770 US EXAM ABDO BACK WALL COMP: CPT

## 2025-05-07 ENCOUNTER — APPOINTMENT (OUTPATIENT)
Dept: UROLOGY | Facility: CLINIC | Age: 55
End: 2025-05-07
Payer: COMMERCIAL

## 2025-05-14 ENCOUNTER — APPOINTMENT (OUTPATIENT)
Dept: UROLOGY | Facility: CLINIC | Age: 55
End: 2025-05-14
Payer: COMMERCIAL

## 2025-06-26 ENCOUNTER — APPOINTMENT (OUTPATIENT)
Dept: UROLOGY | Facility: CLINIC | Age: 55
End: 2025-06-26
Payer: COMMERCIAL

## 2025-06-26 VITALS
RESPIRATION RATE: 16 BRPM | HEART RATE: 74 BPM | OXYGEN SATURATION: 98 % | SYSTOLIC BLOOD PRESSURE: 115 MMHG | DIASTOLIC BLOOD PRESSURE: 75 MMHG | WEIGHT: 182.2 LBS | BODY MASS INDEX: 32.28 KG/M2 | TEMPERATURE: 97.6 F

## 2025-06-26 DIAGNOSIS — N20.0 KIDNEY STONES: Primary | ICD-10-CM

## 2025-06-26 PROCEDURE — 99213 OFFICE O/P EST LOW 20 MIN: CPT | Performed by: PHYSICIAN ASSISTANT

## 2025-06-26 ASSESSMENT — ENCOUNTER SYMPTOMS
HEMATOLOGIC/LYMPHATIC NEGATIVE: 1
MUSCULOSKELETAL NEGATIVE: 1
RESPIRATORY NEGATIVE: 1
GASTROINTESTINAL NEGATIVE: 1
PSYCHIATRIC NEGATIVE: 1
ALLERGIC/IMMUNOLOGIC NEGATIVE: 1
ENDOCRINE NEGATIVE: 1
CONSTITUTIONAL NEGATIVE: 1
NEUROLOGICAL NEGATIVE: 1
CARDIOVASCULAR NEGATIVE: 1
EYES NEGATIVE: 1

## 2025-06-26 ASSESSMENT — PAIN SCALES - GENERAL: PAINLEVEL_OUTOF10: 0-NO PAIN

## 2025-06-26 NOTE — PROGRESS NOTES
Subjective   Patient ID: Randy Boo is a 54 y.o. female who presents for Follow-up.  HPI  Patient is a 54 yo female with renal stones presents for follow-up to review renal ultrasound results.    Patient is doing well.  She continues to increase fluid intake.  She denies flank pain, hematuria, dysuria, or passage of stone.  She denies urinary frequency or urgency.    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      UA trace leuks    Renal ultrasound negative.    === 03/21/25 ===    US RENAL COMPLETE    - Impression -  1. No nephrolithiasis or hydronephrosis.    MACRO:  None      Signed by: Ede Guerrero 3/22/2025 8:51 AM  Dictation workstation:   [id number]    Review of Systems   Constitutional: Negative.    HENT: Negative.     Eyes: Negative.    Respiratory: Negative.     Cardiovascular: Negative.    Gastrointestinal: Negative.    Endocrine: Negative.    Genitourinary: Negative.    Musculoskeletal: Negative.    Skin: Negative.    Allergic/Immunologic: Negative.    Neurological: Negative.    Hematological: Negative.    Psychiatric/Behavioral: Negative.         Objective   Physical Exam  Constitutional:       General: She is not in acute distress.     Appearance: Normal appearance.   HENT:      Head: Normocephalic and atraumatic.      Nose: Nose normal.      Mouth/Throat:      Mouth: Mucous membranes are dry.   Cardiovascular:      Rate and Rhythm: Normal rate.   Pulmonary:      Effort: Pulmonary effort is normal.   Abdominal:      General: Abdomen is flat.      Palpations: Abdomen is soft.   Musculoskeletal:         General: Normal range of motion.      Cervical back: Normal range of motion and neck supple.   Skin:     General: Skin is warm and dry.   Neurological:      General: No focal deficit present.      Mental Status: She is alert and oriented to person, place, and time.   Psychiatric:         Mood and Affect: Mood normal.       Assessment/Plan    Problem List Items Addressed This Visit    None    Renal stone    Continue current management.  I discussed stone prevention by increasing fluid intake to about 2-1/2 L of fluid a day.  Continue adding lemon to the water.  Decrease salt to no greater than 2 g a day.    Follow-up in 1 year with a prior renal ultrasound or sooner as needed       Elia Hale PA-C 06/26/25 2:57 PM